# Patient Record
Sex: FEMALE | Race: WHITE | NOT HISPANIC OR LATINO | Employment: FULL TIME | URBAN - METROPOLITAN AREA
[De-identification: names, ages, dates, MRNs, and addresses within clinical notes are randomized per-mention and may not be internally consistent; named-entity substitution may affect disease eponyms.]

---

## 2021-02-21 ENCOUNTER — APPOINTMENT (OUTPATIENT)
Dept: CT IMAGING | Facility: CLINIC | Age: 56
DRG: 273 | End: 2021-02-21
Attending: EMERGENCY MEDICINE
Payer: COMMERCIAL

## 2021-02-21 ENCOUNTER — HOSPITAL ENCOUNTER (INPATIENT)
Facility: CLINIC | Age: 56
LOS: 1 days | Discharge: HOME | DRG: 273 | End: 2021-02-23
Attending: EMERGENCY MEDICINE | Admitting: INTERNAL MEDICINE
Payer: COMMERCIAL

## 2021-02-21 DIAGNOSIS — I48.92 ATRIAL FLUTTER WITH RAPID VENTRICULAR RESPONSE (H): ICD-10-CM

## 2021-02-21 DIAGNOSIS — R79.89 ELEVATED TROPONIN: Primary | ICD-10-CM

## 2021-02-21 DIAGNOSIS — R79.89 ELEVATED TROPONIN: ICD-10-CM

## 2021-02-21 DIAGNOSIS — R07.9 CHEST PAIN, UNSPECIFIED TYPE: ICD-10-CM

## 2021-02-21 LAB
ALBUMIN SERPL-MCNC: 3.9 G/DL (ref 3.4–5)
ALP SERPL-CCNC: 79 U/L (ref 40–150)
ALT SERPL W P-5'-P-CCNC: 28 U/L (ref 0–50)
ANION GAP SERPL CALCULATED.3IONS-SCNC: 6 MMOL/L (ref 3–14)
AST SERPL W P-5'-P-CCNC: 18 U/L (ref 0–45)
BASOPHILS # BLD AUTO: 0.1 10E9/L (ref 0–0.2)
BASOPHILS NFR BLD AUTO: 0.6 %
BILIRUB SERPL-MCNC: 0.3 MG/DL (ref 0.2–1.3)
BUN SERPL-MCNC: 22 MG/DL (ref 7–30)
CALCIUM SERPL-MCNC: 8.6 MG/DL (ref 8.5–10.1)
CHLORIDE SERPL-SCNC: 105 MMOL/L (ref 94–109)
CO2 SERPL-SCNC: 27 MMOL/L (ref 20–32)
CREAT SERPL-MCNC: 0.71 MG/DL (ref 0.52–1.04)
D DIMER PPP FEU-MCNC: 0.6 UG/ML FEU (ref 0–0.5)
DIFFERENTIAL METHOD BLD: NORMAL
EOSINOPHIL # BLD AUTO: 0.3 10E9/L (ref 0–0.7)
EOSINOPHIL NFR BLD AUTO: 3.7 %
ERYTHROCYTE [DISTWIDTH] IN BLOOD BY AUTOMATED COUNT: 12.7 % (ref 10–15)
GFR SERPL CREATININE-BSD FRML MDRD: >90 ML/MIN/{1.73_M2}
GLUCOSE SERPL-MCNC: 106 MG/DL (ref 70–99)
HCT VFR BLD AUTO: 39 % (ref 35–47)
HGB BLD-MCNC: 13.4 G/DL (ref 11.7–15.7)
IMM GRANULOCYTES # BLD: 0 10E9/L (ref 0–0.4)
IMM GRANULOCYTES NFR BLD: 0.2 %
INTERPRETATION ECG - MUSE: NORMAL
LYMPHOCYTES # BLD AUTO: 3.8 10E9/L (ref 0.8–5.3)
LYMPHOCYTES NFR BLD AUTO: 44.1 %
MCH RBC QN AUTO: 30.7 PG (ref 26.5–33)
MCHC RBC AUTO-ENTMCNC: 34.4 G/DL (ref 31.5–36.5)
MCV RBC AUTO: 89 FL (ref 78–100)
MONOCYTES # BLD AUTO: 1.1 10E9/L (ref 0–1.3)
MONOCYTES NFR BLD AUTO: 13.2 %
NEUTROPHILS # BLD AUTO: 3.3 10E9/L (ref 1.6–8.3)
NEUTROPHILS NFR BLD AUTO: 38.2 %
NRBC # BLD AUTO: 0 10*3/UL
NRBC BLD AUTO-RTO: 0 /100
PLATELET # BLD AUTO: 346 10E9/L (ref 150–450)
POTASSIUM SERPL-SCNC: 3.6 MMOL/L (ref 3.4–5.3)
PROT SERPL-MCNC: 7.1 G/DL (ref 6.8–8.8)
RBC # BLD AUTO: 4.36 10E12/L (ref 3.8–5.2)
SODIUM SERPL-SCNC: 138 MMOL/L (ref 133–144)
TROPONIN I SERPL-MCNC: <0.015 UG/L (ref 0–0.04)
WBC # BLD AUTO: 8.6 10E9/L (ref 4–11)

## 2021-02-21 PROCEDURE — 96365 THER/PROPH/DIAG IV INF INIT: CPT

## 2021-02-21 PROCEDURE — 250N000013 HC RX MED GY IP 250 OP 250 PS 637: Performed by: EMERGENCY MEDICINE

## 2021-02-21 PROCEDURE — C9803 HOPD COVID-19 SPEC COLLECT: HCPCS

## 2021-02-21 PROCEDURE — 80053 COMPREHEN METABOLIC PANEL: CPT | Performed by: EMERGENCY MEDICINE

## 2021-02-21 PROCEDURE — 84484 ASSAY OF TROPONIN QUANT: CPT | Performed by: EMERGENCY MEDICINE

## 2021-02-21 PROCEDURE — 93005 ELECTROCARDIOGRAM TRACING: CPT

## 2021-02-21 PROCEDURE — 85025 COMPLETE CBC W/AUTO DIFF WBC: CPT | Performed by: EMERGENCY MEDICINE

## 2021-02-21 PROCEDURE — 71275 CT ANGIOGRAPHY CHEST: CPT

## 2021-02-21 PROCEDURE — 85379 FIBRIN DEGRADATION QUANT: CPT | Performed by: EMERGENCY MEDICINE

## 2021-02-21 PROCEDURE — 99285 EMERGENCY DEPT VISIT HI MDM: CPT | Mod: 25

## 2021-02-21 RX ORDER — ASPIRIN 81 MG/1
324 TABLET, CHEWABLE ORAL ONCE
Status: COMPLETED | OUTPATIENT
Start: 2021-02-21 | End: 2021-02-21

## 2021-02-21 RX ORDER — IOPAMIDOL 755 MG/ML
92 INJECTION, SOLUTION INTRAVASCULAR ONCE
Status: COMPLETED | OUTPATIENT
Start: 2021-02-22 | End: 2021-02-22

## 2021-02-21 RX ORDER — FLUOXETINE 10 MG/1
20 CAPSULE ORAL 2 TIMES DAILY
COMMUNITY
End: 2023-04-05

## 2021-02-21 RX ADMIN — ASPIRIN 81 MG CHEWABLE TABLET 324 MG: 81 TABLET CHEWABLE at 23:12

## 2021-02-21 ASSESSMENT — MIFFLIN-ST. JEOR: SCORE: 1239.53

## 2021-02-21 ASSESSMENT — ENCOUNTER SYMPTOMS
SHORTNESS OF BREATH: 1
PALPITATIONS: 1

## 2021-02-22 ENCOUNTER — APPOINTMENT (OUTPATIENT)
Dept: CARDIOLOGY | Facility: CLINIC | Age: 56
DRG: 273 | End: 2021-02-22
Attending: INTERNAL MEDICINE
Payer: COMMERCIAL

## 2021-02-22 PROBLEM — R79.89 ELEVATED TROPONIN: Status: ACTIVE | Noted: 2021-02-21

## 2021-02-22 PROBLEM — I48.92 ATRIAL FLUTTER WITH RAPID VENTRICULAR RESPONSE (H): Status: ACTIVE | Noted: 2021-02-22

## 2021-02-22 PROBLEM — R07.9 CHEST PAIN, UNSPECIFIED TYPE: Status: ACTIVE | Noted: 2021-02-22

## 2021-02-22 LAB
ANION GAP SERPL CALCULATED.3IONS-SCNC: 3 MMOL/L (ref 3–14)
APTT PPP: >240 SEC (ref 22–37)
BUN SERPL-MCNC: 18 MG/DL (ref 7–30)
CALCIUM SERPL-MCNC: 8.7 MG/DL (ref 8.5–10.1)
CHLORIDE SERPL-SCNC: 109 MMOL/L (ref 94–109)
CO2 SERPL-SCNC: 28 MMOL/L (ref 20–32)
CREAT SERPL-MCNC: 0.74 MG/DL (ref 0.52–1.04)
ERYTHROCYTE [DISTWIDTH] IN BLOOD BY AUTOMATED COUNT: 12.8 % (ref 10–15)
ERYTHROCYTE [DISTWIDTH] IN BLOOD BY AUTOMATED COUNT: 12.9 % (ref 10–15)
GFR SERPL CREATININE-BSD FRML MDRD: 89 ML/MIN/{1.73_M2}
GLUCOSE SERPL-MCNC: 99 MG/DL (ref 70–99)
HCT VFR BLD AUTO: 35.6 % (ref 35–47)
HCT VFR BLD AUTO: 36.7 % (ref 35–47)
HGB BLD-MCNC: 11.6 G/DL (ref 11.7–15.7)
HGB BLD-MCNC: 12.1 G/DL (ref 11.7–15.7)
LABORATORY COMMENT REPORT: NORMAL
MAGNESIUM SERPL-MCNC: 2.2 MG/DL (ref 1.6–2.3)
MCH RBC QN AUTO: 29.7 PG (ref 26.5–33)
MCH RBC QN AUTO: 30 PG (ref 26.5–33)
MCHC RBC AUTO-ENTMCNC: 32.6 G/DL (ref 31.5–36.5)
MCHC RBC AUTO-ENTMCNC: 33 G/DL (ref 31.5–36.5)
MCV RBC AUTO: 91 FL (ref 78–100)
MCV RBC AUTO: 91 FL (ref 78–100)
PLATELET # BLD AUTO: 297 10E9/L (ref 150–450)
PLATELET # BLD AUTO: 312 10E9/L (ref 150–450)
POTASSIUM SERPL-SCNC: 3.8 MMOL/L (ref 3.4–5.3)
POTASSIUM SERPL-SCNC: 4.3 MMOL/L (ref 3.4–5.3)
RBC # BLD AUTO: 3.9 10E12/L (ref 3.8–5.2)
RBC # BLD AUTO: 4.03 10E12/L (ref 3.8–5.2)
SARS-COV-2 RNA RESP QL NAA+PROBE: NEGATIVE
SODIUM SERPL-SCNC: 140 MMOL/L (ref 133–144)
SPECIMEN SOURCE: NORMAL
T4 FREE SERPL-MCNC: 0.69 NG/DL (ref 0.76–1.46)
TROPONIN I SERPL-MCNC: 0.11 UG/L (ref 0–0.04)
TROPONIN I SERPL-MCNC: 0.18 UG/L (ref 0–0.04)
TSH SERPL DL<=0.005 MIU/L-ACNC: 4.01 MU/L (ref 0.4–4)
UFH PPP CHRO-ACNC: 0.51 IU/ML
WBC # BLD AUTO: 7.4 10E9/L (ref 4–11)
WBC # BLD AUTO: 8.1 10E9/L (ref 4–11)

## 2021-02-22 PROCEDURE — 4A023N7 MEASUREMENT OF CARDIAC SAMPLING AND PRESSURE, LEFT HEART, PERCUTANEOUS APPROACH: ICD-10-PCS | Performed by: INTERNAL MEDICINE

## 2021-02-22 PROCEDURE — 80048 BASIC METABOLIC PNL TOTAL CA: CPT | Performed by: INTERNAL MEDICINE

## 2021-02-22 PROCEDURE — 84439 ASSAY OF FREE THYROXINE: CPT | Performed by: INTERNAL MEDICINE

## 2021-02-22 PROCEDURE — 250N000013 HC RX MED GY IP 250 OP 250 PS 637: Performed by: INTERNAL MEDICINE

## 2021-02-22 PROCEDURE — 250N000011 HC RX IP 250 OP 636: Performed by: EMERGENCY MEDICINE

## 2021-02-22 PROCEDURE — 85730 THROMBOPLASTIN TIME PARTIAL: CPT | Performed by: INTERNAL MEDICINE

## 2021-02-22 PROCEDURE — 250N000011 HC RX IP 250 OP 636: Performed by: INTERNAL MEDICINE

## 2021-02-22 PROCEDURE — C1887 CATHETER, GUIDING: HCPCS | Performed by: INTERNAL MEDICINE

## 2021-02-22 PROCEDURE — 87635 SARS-COV-2 COVID-19 AMP PRB: CPT | Performed by: EMERGENCY MEDICINE

## 2021-02-22 PROCEDURE — 99152 MOD SED SAME PHYS/QHP 5/>YRS: CPT | Performed by: INTERNAL MEDICINE

## 2021-02-22 PROCEDURE — 83735 ASSAY OF MAGNESIUM: CPT | Performed by: INTERNAL MEDICINE

## 2021-02-22 PROCEDURE — 99153 MOD SED SAME PHYS/QHP EA: CPT | Performed by: INTERNAL MEDICINE

## 2021-02-22 PROCEDURE — C1769 GUIDE WIRE: HCPCS | Performed by: INTERNAL MEDICINE

## 2021-02-22 PROCEDURE — 99222 1ST HOSP IP/OBS MODERATE 55: CPT | Mod: 25 | Performed by: INTERNAL MEDICINE

## 2021-02-22 PROCEDURE — 93621 COMP EP EVL L PAC&REC C SINS: CPT | Performed by: INTERNAL MEDICINE

## 2021-02-22 PROCEDURE — 250N000009 HC RX 250: Performed by: INTERNAL MEDICINE

## 2021-02-22 PROCEDURE — 93010 ELECTROCARDIOGRAM REPORT: CPT | Performed by: INTERNAL MEDICINE

## 2021-02-22 PROCEDURE — 4A023FZ MEASUREMENT OF CARDIAC RHYTHM, PERCUTANEOUS APPROACH: ICD-10-PCS | Performed by: INTERNAL MEDICINE

## 2021-02-22 PROCEDURE — B2101ZZ FLUOROSCOPY OF SINGLE CORONARY ARTERY USING LOW OSMOLAR CONTRAST: ICD-10-PCS | Performed by: INTERNAL MEDICINE

## 2021-02-22 PROCEDURE — 85347 COAGULATION TIME ACTIVATED: CPT

## 2021-02-22 PROCEDURE — 36415 COLL VENOUS BLD VENIPUNCTURE: CPT | Performed by: INTERNAL MEDICINE

## 2021-02-22 PROCEDURE — 99232 SBSQ HOSP IP/OBS MODERATE 35: CPT | Performed by: INTERNAL MEDICINE

## 2021-02-22 PROCEDURE — 02K83ZZ MAP CONDUCTION MECHANISM, PERCUTANEOUS APPROACH: ICD-10-PCS | Performed by: INTERNAL MEDICINE

## 2021-02-22 PROCEDURE — C1894 INTRO/SHEATH, NON-LASER: HCPCS | Performed by: INTERNAL MEDICINE

## 2021-02-22 PROCEDURE — C1732 CATH, EP, DIAG/ABL, 3D/VECT: HCPCS | Performed by: INTERNAL MEDICINE

## 2021-02-22 PROCEDURE — 85027 COMPLETE CBC AUTOMATED: CPT | Performed by: INTERNAL MEDICINE

## 2021-02-22 PROCEDURE — 93005 ELECTROCARDIOGRAM TRACING: CPT

## 2021-02-22 PROCEDURE — 84132 ASSAY OF SERUM POTASSIUM: CPT | Performed by: INTERNAL MEDICINE

## 2021-02-22 PROCEDURE — 93306 TTE W/DOPPLER COMPLETE: CPT | Mod: 26 | Performed by: INTERNAL MEDICINE

## 2021-02-22 PROCEDURE — 93452 LEFT HRT CATH W/VENTRCLGRPHY: CPT | Performed by: INTERNAL MEDICINE

## 2021-02-22 PROCEDURE — 93613 INTRACARDIAC EPHYS 3D MAPG: CPT | Performed by: INTERNAL MEDICINE

## 2021-02-22 PROCEDURE — 272N000001 HC OR GENERAL SUPPLY STERILE: Performed by: INTERNAL MEDICINE

## 2021-02-22 PROCEDURE — 250N000009 HC RX 250: Performed by: EMERGENCY MEDICINE

## 2021-02-22 PROCEDURE — 120N000004 HC R&B MS OVERFLOW

## 2021-02-22 PROCEDURE — 84484 ASSAY OF TROPONIN QUANT: CPT | Performed by: INTERNAL MEDICINE

## 2021-02-22 PROCEDURE — 99222 1ST HOSP IP/OBS MODERATE 55: CPT | Mod: AI | Performed by: INTERNAL MEDICINE

## 2021-02-22 PROCEDURE — 93458 L HRT ARTERY/VENTRICLE ANGIO: CPT | Performed by: INTERNAL MEDICINE

## 2021-02-22 PROCEDURE — 93306 TTE W/DOPPLER COMPLETE: CPT

## 2021-02-22 PROCEDURE — 02583ZZ DESTRUCTION OF CONDUCTION MECHANISM, PERCUTANEOUS APPROACH: ICD-10-PCS | Performed by: INTERNAL MEDICINE

## 2021-02-22 PROCEDURE — 84443 ASSAY THYROID STIM HORMONE: CPT | Performed by: INTERNAL MEDICINE

## 2021-02-22 PROCEDURE — 250N000011 HC RX IP 250 OP 636

## 2021-02-22 PROCEDURE — C1733 CATH, EP, OTHR THAN COOL-TIP: HCPCS | Performed by: INTERNAL MEDICINE

## 2021-02-22 PROCEDURE — 85520 HEPARIN ASSAY: CPT | Performed by: INTERNAL MEDICINE

## 2021-02-22 PROCEDURE — C1731 CATH, EP, 20 OR MORE ELEC: HCPCS | Performed by: INTERNAL MEDICINE

## 2021-02-22 PROCEDURE — 258N000003 HC RX IP 258 OP 636: Performed by: INTERNAL MEDICINE

## 2021-02-22 PROCEDURE — 93653 COMPRE EP EVAL TX SVT: CPT | Performed by: INTERNAL MEDICINE

## 2021-02-22 RX ORDER — FLUMAZENIL 0.1 MG/ML
0.2 INJECTION, SOLUTION INTRAVENOUS
Status: ACTIVE | OUTPATIENT
Start: 2021-02-22 | End: 2021-02-22

## 2021-02-22 RX ORDER — AMOXICILLIN 250 MG
1 CAPSULE ORAL 2 TIMES DAILY PRN
Status: DISCONTINUED | OUTPATIENT
Start: 2021-02-22 | End: 2021-02-23 | Stop reason: HOSPADM

## 2021-02-22 RX ORDER — HEPARIN SODIUM 10000 [USP'U]/100ML
0-5000 INJECTION, SOLUTION INTRAVENOUS CONTINUOUS
Status: DISCONTINUED | OUTPATIENT
Start: 2021-02-22 | End: 2021-02-22

## 2021-02-22 RX ORDER — ASPIRIN 81 MG/1
81 TABLET ORAL DAILY
Status: DISCONTINUED | OUTPATIENT
Start: 2021-02-22 | End: 2021-02-23

## 2021-02-22 RX ORDER — VERAPAMIL HYDROCHLORIDE 2.5 MG/ML
INJECTION, SOLUTION INTRAVENOUS
Status: DISCONTINUED | OUTPATIENT
Start: 2021-02-22 | End: 2021-02-22 | Stop reason: HOSPADM

## 2021-02-22 RX ORDER — CITALOPRAM HYDROBROMIDE 40 MG/1
40 TABLET ORAL DAILY
Status: DISCONTINUED | OUTPATIENT
Start: 2021-02-22 | End: 2021-02-23 | Stop reason: HOSPADM

## 2021-02-22 RX ORDER — CITALOPRAM HYDROBROMIDE 40 MG/1
40 TABLET ORAL DAILY
COMMUNITY
End: 2023-04-05

## 2021-02-22 RX ORDER — POTASSIUM CHLORIDE 1500 MG/1
20 TABLET, EXTENDED RELEASE ORAL
Status: CANCELLED | OUTPATIENT
Start: 2021-02-22

## 2021-02-22 RX ORDER — CEFAZOLIN SODIUM 1 G/3ML
1 INJECTION, POWDER, FOR SOLUTION INTRAMUSCULAR; INTRAVENOUS
Status: DISCONTINUED | OUTPATIENT
Start: 2021-02-22 | End: 2021-02-22 | Stop reason: HOSPADM

## 2021-02-22 RX ORDER — LIDOCAINE 40 MG/G
CREAM TOPICAL
Status: CANCELLED | OUTPATIENT
Start: 2021-02-22

## 2021-02-22 RX ORDER — SODIUM CHLORIDE 9 MG/ML
INJECTION, SOLUTION INTRAVENOUS CONTINUOUS
Status: CANCELLED | OUTPATIENT
Start: 2021-02-22

## 2021-02-22 RX ORDER — ATROPINE SULFATE 0.1 MG/ML
0.5 INJECTION INTRAVENOUS
Status: ACTIVE | OUTPATIENT
Start: 2021-02-22 | End: 2021-02-22

## 2021-02-22 RX ORDER — MULTIVITAMIN,THERAPEUTIC
1 TABLET ORAL DAILY
COMMUNITY

## 2021-02-22 RX ORDER — ACETAMINOPHEN 325 MG/1
650 TABLET ORAL EVERY 4 HOURS PRN
Status: DISCONTINUED | OUTPATIENT
Start: 2021-02-22 | End: 2021-02-22

## 2021-02-22 RX ORDER — SODIUM CHLORIDE 9 MG/ML
INJECTION, SOLUTION INTRAVENOUS CONTINUOUS
Status: DISCONTINUED | OUTPATIENT
Start: 2021-02-22 | End: 2021-02-23 | Stop reason: HOSPADM

## 2021-02-22 RX ORDER — HEPARIN SODIUM 200 [USP'U]/100ML
100-500 INJECTION, SOLUTION INTRAVENOUS CONTINUOUS PRN
Status: DISCONTINUED | OUTPATIENT
Start: 2021-02-22 | End: 2021-02-22 | Stop reason: HOSPADM

## 2021-02-22 RX ORDER — FENTANYL CITRATE 50 UG/ML
INJECTION, SOLUTION INTRAMUSCULAR; INTRAVENOUS
Status: DISCONTINUED | OUTPATIENT
Start: 2021-02-22 | End: 2021-02-22 | Stop reason: HOSPADM

## 2021-02-22 RX ORDER — LIDOCAINE 40 MG/G
CREAM TOPICAL
Status: DISCONTINUED | OUTPATIENT
Start: 2021-02-22 | End: 2021-02-22

## 2021-02-22 RX ORDER — CHOLECALCIFEROL (VITAMIN D3) 50 MCG
1 TABLET ORAL DAILY
COMMUNITY
End: 2023-04-05

## 2021-02-22 RX ORDER — HEPARIN SODIUM 1000 [USP'U]/ML
INJECTION, SOLUTION INTRAVENOUS; SUBCUTANEOUS
Status: DISCONTINUED | OUTPATIENT
Start: 2021-02-22 | End: 2021-02-22 | Stop reason: HOSPADM

## 2021-02-22 RX ORDER — NITROGLYCERIN 0.4 MG/1
0.4 TABLET SUBLINGUAL EVERY 5 MIN PRN
Status: DISCONTINUED | OUTPATIENT
Start: 2021-02-22 | End: 2021-02-23 | Stop reason: HOSPADM

## 2021-02-22 RX ORDER — ACETAMINOPHEN 325 MG/1
650 TABLET ORAL EVERY 4 HOURS PRN
Status: DISCONTINUED | OUTPATIENT
Start: 2021-02-22 | End: 2021-02-23 | Stop reason: HOSPADM

## 2021-02-22 RX ORDER — NALOXONE HYDROCHLORIDE 0.4 MG/ML
0.4 INJECTION, SOLUTION INTRAMUSCULAR; INTRAVENOUS; SUBCUTANEOUS
Status: DISCONTINUED | OUTPATIENT
Start: 2021-02-22 | End: 2021-02-23 | Stop reason: HOSPADM

## 2021-02-22 RX ORDER — NITROGLYCERIN 5 MG/ML
VIAL (ML) INTRAVENOUS
Status: DISCONTINUED | OUTPATIENT
Start: 2021-02-22 | End: 2021-02-22 | Stop reason: HOSPADM

## 2021-02-22 RX ORDER — HEPARIN SODIUM 200 [USP'U]/100ML
100-600 INJECTION, SOLUTION INTRAVENOUS CONTINUOUS PRN
Status: DISCONTINUED | OUTPATIENT
Start: 2021-02-22 | End: 2021-02-22 | Stop reason: HOSPADM

## 2021-02-22 RX ORDER — DOBUTAMINE HYDROCHLORIDE 200 MG/100ML
5-40 INJECTION INTRAVENOUS CONTINUOUS PRN
Status: DISCONTINUED | OUTPATIENT
Start: 2021-02-22 | End: 2021-02-22 | Stop reason: HOSPADM

## 2021-02-22 RX ORDER — LIDOCAINE 40 MG/G
CREAM TOPICAL
Status: DISCONTINUED | OUTPATIENT
Start: 2021-02-22 | End: 2021-02-23 | Stop reason: HOSPADM

## 2021-02-22 RX ORDER — LORAZEPAM 0.5 MG/1
0.5 TABLET ORAL
Status: CANCELLED | OUTPATIENT
Start: 2021-02-22

## 2021-02-22 RX ORDER — LORAZEPAM 2 MG/ML
0.5 INJECTION INTRAMUSCULAR
Status: CANCELLED | OUTPATIENT
Start: 2021-02-22

## 2021-02-22 RX ORDER — AMOXICILLIN 250 MG
2 CAPSULE ORAL 2 TIMES DAILY PRN
Status: DISCONTINUED | OUTPATIENT
Start: 2021-02-22 | End: 2021-02-23 | Stop reason: HOSPADM

## 2021-02-22 RX ORDER — ACETAMINOPHEN 650 MG/1
650 SUPPOSITORY RECTAL EVERY 4 HOURS PRN
Status: DISCONTINUED | OUTPATIENT
Start: 2021-02-22 | End: 2021-02-23 | Stop reason: HOSPADM

## 2021-02-22 RX ORDER — ONDANSETRON 4 MG/1
4 TABLET, ORALLY DISINTEGRATING ORAL EVERY 6 HOURS PRN
Status: DISCONTINUED | OUTPATIENT
Start: 2021-02-22 | End: 2021-02-23 | Stop reason: HOSPADM

## 2021-02-22 RX ORDER — OXYCODONE AND ACETAMINOPHEN 5; 325 MG/1; MG/1
1 TABLET ORAL EVERY 4 HOURS PRN
Status: DISCONTINUED | OUTPATIENT
Start: 2021-02-22 | End: 2021-02-23 | Stop reason: HOSPADM

## 2021-02-22 RX ORDER — MULTIVITAMIN WITH IRON
1 TABLET ORAL DAILY
COMMUNITY
End: 2023-04-05

## 2021-02-22 RX ORDER — HEPARIN SODIUM 10000 [USP'U]/100ML
0-5000 INJECTION, SOLUTION INTRAVENOUS CONTINUOUS
Status: DISCONTINUED | OUTPATIENT
Start: 2021-02-22 | End: 2021-02-22 | Stop reason: CLARIF

## 2021-02-22 RX ORDER — SODIUM PHOSPHATE,MONO-DIBASIC 19G-7G/118
1 ENEMA (ML) RECTAL DAILY
COMMUNITY
End: 2023-04-05

## 2021-02-22 RX ORDER — IOPAMIDOL 755 MG/ML
INJECTION, SOLUTION INTRAVASCULAR
Status: DISCONTINUED | OUTPATIENT
Start: 2021-02-22 | End: 2021-02-22 | Stop reason: HOSPADM

## 2021-02-22 RX ORDER — ONDANSETRON 2 MG/ML
4 INJECTION INTRAMUSCULAR; INTRAVENOUS EVERY 6 HOURS PRN
Status: DISCONTINUED | OUTPATIENT
Start: 2021-02-22 | End: 2021-02-23 | Stop reason: HOSPADM

## 2021-02-22 RX ORDER — NALOXONE HYDROCHLORIDE 0.4 MG/ML
0.2 INJECTION, SOLUTION INTRAMUSCULAR; INTRAVENOUS; SUBCUTANEOUS
Status: DISCONTINUED | OUTPATIENT
Start: 2021-02-22 | End: 2021-02-23 | Stop reason: HOSPADM

## 2021-02-22 RX ORDER — FENTANYL CITRATE 50 UG/ML
25-50 INJECTION, SOLUTION INTRAMUSCULAR; INTRAVENOUS
Status: ACTIVE | OUTPATIENT
Start: 2021-02-22 | End: 2021-02-22

## 2021-02-22 RX ADMIN — METOPROLOL TARTRATE 12.5 MG: 25 TABLET, FILM COATED ORAL at 20:45

## 2021-02-22 RX ADMIN — ASPIRIN 81 MG: 81 TABLET, DELAYED RELEASE ORAL at 09:46

## 2021-02-22 RX ADMIN — SODIUM CHLORIDE: 9 INJECTION, SOLUTION INTRAVENOUS at 02:45

## 2021-02-22 RX ADMIN — FLUOXETINE 20 MG: 20 CAPSULE ORAL at 20:46

## 2021-02-22 RX ADMIN — METOPROLOL TARTRATE 12.5 MG: 25 TABLET, FILM COATED ORAL at 09:46

## 2021-02-22 RX ADMIN — IOPAMIDOL 92 ML: 755 INJECTION, SOLUTION INTRAVENOUS at 00:02

## 2021-02-22 RX ADMIN — SODIUM CHLORIDE 60 ML: 9 INJECTION, SOLUTION INTRAVENOUS at 00:02

## 2021-02-22 RX ADMIN — SODIUM CHLORIDE: 9 INJECTION, SOLUTION INTRAVENOUS at 12:28

## 2021-02-22 RX ADMIN — APIXABAN 5 MG: 5 TABLET, FILM COATED ORAL at 20:46

## 2021-02-22 RX ADMIN — FLUOXETINE 20 MG: 20 CAPSULE ORAL at 12:28

## 2021-02-22 RX ADMIN — CITALOPRAM HYDROBROMIDE 40 MG: 40 TABLET ORAL at 12:28

## 2021-02-22 RX ADMIN — HEPARIN SODIUM 800 UNITS/HR: 10000 INJECTION, SOLUTION INTRAVENOUS at 01:02

## 2021-02-22 ASSESSMENT — MIFFLIN-ST. JEOR: SCORE: 1290.33

## 2021-02-22 ASSESSMENT — ACTIVITIES OF DAILY LIVING (ADL)
ADLS_ACUITY_SCORE: 15

## 2021-02-22 NOTE — PLAN OF CARE
Arrived to unit at 0200. A&O x4. VSS. Tele NSR. Up independently in room. Heparin gtt at 800 units/hr. Lung sounds clear. Bowel sounds active. NPO. Voiding adequately. Denies pain.

## 2021-02-22 NOTE — H&P
Admitted:     02/21/2021     DATE Of SERVICE: 02/22/2021.     PRIMARY CARE PHYSICIAN:  [facility]      CHIEF COMPLAINT:  Palpitations and chest pain.      HISTORY OF PRESENT ILLNESS:  Had been obtained from the patient who is a good historian.  I discussed with ER attending, Dr. Bishop and I reviewed her chart as well.      Mrs. Shabnam Shukla is an extremely pleasant 55-year-old female with past medical history of depression/anxiety, who presented for evaluation of palpitations and chest pain.      The patient states that for the last year and a half, she was having intermittent episodes of chest discomfort associated with palpitations, dizziness, shortness of breath and feeling that she can pass out that usually lasting 30-45 seconds and would resolve by itself.  She did not seek medical attention for these issues.  She reports that these episodes of palpitations/chest pain/shortness of breath would occur, usually with exertion.      She states that last night around 9:00 p.m. while she was lying down, she started having again palpitations and chest pain.  She describes the pain as located midsternal, nonradiating, described as dull, burning feeling.  She reports the pain as a 6 to 7/10 in intensity, associated with some dizziness and shortness of breath.  Her symptoms lasted longer than her usual episodes, so she called 911.  As per report, she was in atrial flutter with rapid ventricular rate by EMS arrival.  She was brought into the ER.  Apparently, she converted spontaneously to normal sinus rhythm.  She was in normal sinus rhythm at the time of her arrival in ER.  Her vitals in ER showed a blood pressure of 127/81, heart rate was 102 initially, later on improved to 87, respiratory rate 16, oxygen saturation 96% on room air and temperature 97.9.      In the ER, she was seen by Dr. Bishop.  She had basic blood work which showed unremarkable BMP.  Her sodium was 138, potassium 3.6, chloride 105,  bicarbonate 27, BUN 22, creatinine was 0.71.  Her calcium was 8.1, anion gap of 6, albumin 3.9, total protein 7.1, total bilirubin 0.3, alkaline phosphatase 79, ALT 28, AST 18.  Her troponin was negative, less than 0.015.  Glucose 106.  CBC with white blood cells 8.6, hemoglobin 13.4, hematocrit 39 and platelet count 346.  Her D-dimer was mildly elevated at 0.6.  She had a CT of the chest with IV contrast, which was negative for any pulmonary embolism or any acute cardiopulmonary disease.  She was tested negative for COVID-19 infection.  Her EKG in ER showed normal sinus rhythm at the rate of 96-95 beats per minute, no ischemic changes.      Upon further questioning, the patient denies any recent fevers.  She denies any headache, no nausea, no vomiting, no coughing.  She denies any abdominal pain, no diarrhea, no constipation, no dysuria, no leg swelling.      In ER, she was given aspirin 325 mg p.o. x 1.  Given a concern for unstable angina, she was started on heparin drip and Hospitalist Service was called regarding the admission.      PAST MEDICAL HISTORY:  Depression/anxiety.      PAST SURGICAL HISTORY:   Past Surgical History:   Procedure Laterality Date     CV CORONARY ANGIOGRAM N/A 2/22/2021    Procedure: Coronary Angiogram;  Surgeon: Lucille Francis MD;  Location: [facility] CARDIAC CATH LAB     CV LEFT HEART CATH N/A 2/22/2021    Procedure: Left Heart Cath;  Surgeon: Lucille Francis MD;  Location: [facility] CARDIAC CATH LAB     EP ABLATION ATRIAL FLUTTER N/A 2/22/2021    Procedure: EP Ablation Atrial Flutter;  Surgeon: Juan R Lloyd MD;  Location: [facility] CARDIAC CATH LAB        FAMILY HISTORY:  She reports that her mother had 3 myocardial infarctions and stents placed.  Her father seems to be in good state of health.  She has a daughter also no major medical problems.      SOCIAL HISTORY:  She used to smoke.  She quit smoking 2 months ago.  She drinks alcohol occasionally.  She denies illicit  drug abuse.      PRIOR TO ADMISSION MEDICATIONS:   citalopram (CELEXA) 40 MG tablet Take 40 mg by mouth daily 2/21/2021 at am Yes Unknown, Entered By History   FLUoxetine (PROZAC) 10 MG capsule Take 20 mg by mouth 2 times daily 2/21/2021 at am Yes Reported, Patient   glucosamine-chondroitin 500-400 MG CAPS per capsule Take 1 capsule by mouth daily 2/21/2021 at am Yes Unknown, Entered By History   multivitamin, therapeutic (THERA-VIT) TABS tablet Take 1 tablet by mouth daily 2/21/2021 at am Yes Unknown, Entered By History   vitamin (B COMPLEX-C) tablet Take 1 tablet by mouth daily 2/21/2021 at am Yes Unknown, Entered By History   vitamin D3 (CHOLECALCIFEROL) 50 mcg (2000 units) tablet Take 1 tablet by mouth daily 2/21/2021 at am Yes Unknown, Entered By History          ALLERGIES:  NO KNOWN DRUG ALLERGIES.      REVIEW OF SYSTEMS:  The 10-point review of systems was conducted and it was negative except for pertinent positives mentioned in history of present illness.      PHYSICAL EXAMINATION:   VITAL SIGNS:  Blood pressure is 111/74, heart rate 88, respiratory rate 18, oxygen saturation 97% on room air, temperature 97.9.   GENERAL:  The patient is awake, alert, no acute distress at the time of my examination.   HEENT:  Normocephalic, atraumatic.  Pupils are equally round and reactive to light.  Oral mucosa is moist.   NECK:  Supple.  No cervical lymphadenopathy, no thyromegaly.   CHEST:  There is bilateral air entry, no wheezing, no rales, no crackles.   CARDIOVASCULAR:  There is normal S1, S2, regular rate and rhythm, no murmurs, no rubs.   ABDOMEN:  Soft, nontender, nondistended.  Bowel sounds are present.   EXTREMITIES:  There is no leg swelling, no calf tenderness, 2+ peripheral pulses are palpable.   SKIN:  Intact, no rashes, no cyanosis.   NEUROLOGIC:  The patient is awake, alert, oriented to self, place and time.  There are no focal neurological deficits.   PSYCHIATRIC:  Normal mood, normal affect.    MUSCULOSKELETAL:  She moves all extremities freely.  There are no obvious joint deformities.      LABORATORY DATA:  Reviewed and dictated above.      ASSESSMENT AND PLAN:  Mrs. Shabnam Shukla is a very pleasant 55-year-old female with a past medical history of depression/anxiety, who presented for evaluation of an episode of palpitations associated with chest pain or shortness of breath.  She was found to be in atrial flutter with rapid ventricular rate by EMS, which eventually converted back to sinus rhythm.   1.  Paroxysmal atrial flutter with rapid ventricular response  2.  Concern for unstable angina.    She reports that for the last year and a half she was having intermittent episodes of palpitations associated with chest discomfort, shortness of breath, dizziness and feeling that she was going to pass out.  She does have symptoms occurs with exertion and last 30-45 seconds and resolved by itself, but last night she had an episode that occurred at rest and lasted more than 15 minutes.  EMS was called and apparently she was in atrial flutter with rapid ventricular rate, which eventually converted back to normal sinus rhythm spontaneously.  She was in normal sinus rhythm in ER.  She was hemodynamically stable and she did not have any chest pain.  In ER, she was given aspirin 324 mg p.o. and started on heparin drip given concern for unstable angina.  Her troponin is negative and EKG does not show any ischemic changes.  She is admitted to [facility].  She will be monitored on telemetry.  We will check her TSH.  We will continue to monitor serial troponins.  We will continue with a baby aspirin and heparin drip at this time.  Echocardiogram had been ordered for the morning.  Cardiology consult requested.  We will keep her n.p.o. for now.  We will defer to Cardiology if they want to proceed with a cardiac stress test versus angiogram.  We will start her on low-dose metoprolol 12.5 mg p.o. twice daily.  We will  check magnesium level and replace electrolytes as per protocol.   3.  History of depression/anxiety.  We will resume her prior to admission Prozac and Celexa once medications will be verified by the pharmacy.   4.  Deep venous thrombosis prophylaxis.  The patient is fully anticoagulated with heparin drip at this time.   5.  CODE STATUS:  Discussed with the patient.  The patient is full code.   6.  DISPOSITION:  Admit inpatient.  I anticipate couple of days of hospitalization.         RAYNE YOUNG MD             D: 2021   T: 2021   MT: [facility]      Name:     CHUCK MARIN   MRN:      [id number]        Account:      [id number]   :      1965        Admitted:     2021                   Document: [id number]       cc: [facility]

## 2021-02-22 NOTE — PRE-PROCEDURE
GENERAL PRE-PROCEDURE:   Procedure:  Aflutter ablation  Date/Time:  2/22/2021 2:07 PM    Written consent obtained?: Yes    Risks and benefits: Risks, benefits and alternatives were discussed    Consent given by:  Patient  Patient states understanding of procedure being performed: Yes    Patient's understanding of procedure matches consent: Yes    Procedure consent matches procedure scheduled: Yes    Expected level of sedation:  Moderate  Appropriately NPO:  Yes  ASA Class:  Class 2- mild systemic disease, no acute problems, no functional limitations  Mallampati  :  Grade 2- soft palate, base of uvula, tonsillar pillars, and portion of posterior pharyngeal wall visible  Lungs:  Lungs clear with good breath sounds bilaterally  Heart:  Normal heart sounds and rate  History & Physical reviewed:  History and physical reviewed and no updates needed  Statement of review:  I have reviewed the lab findings, diagnostic data, medications, and the plan for sedation

## 2021-02-22 NOTE — ED NOTES
"[facility]  ED Nurse Handoff Report    ED Chief complaint: Palpitations      ED Diagnosis:   Final diagnoses:   Atrial flutter with rapid ventricular response (H)   Chest pain, unspecified type       Code Status: Full Code    Allergies: No Known Allergies    Patient Story: Pt presents via EMS for palpitations for 15 minutes. Pt has had them for the last 1.5 years but they usually resolve after a short bit. Pt also reports hx of chest pain and SOB with exertion.  Focused Assessment:  Pt is currently in  NSR. Elevated d dimer of 0.6. CT scan chest shows no PE. Heparin bolus and gtt started.    Treatments and/or interventions provided: see above  Patient's response to treatments and/or interventions: see above    To be done/followed up on inpatient unit:  none pending    Does this patient have any cognitive concerns?: none    Activity level - Baseline/Home:  Independent  Activity Level - Current:   Independent    Patient's Preferred language: English   Needed?: No    Isolation: None  Infection: Not Applicable  Patient tested for COVID 19 prior to admission: YES  Bariatric?: No    Vital Signs:   Vitals:    02/21/21 2234   BP: 127/81   Pulse: 102   Resp: 16   Weight: 68 kg (150 lb)   Height: 1.6 m (5' 3\")       Cardiac Rhythm:     Was the PSS-3 completed:   Yes  What interventions are required if any?               Family Comments:  Pat present  OBS brochure/video discussed/provided to patient/family: N/A              Name of person given brochure if not patient: N/A              Relationship to patient: N/A    For the majority of the shift this patient's behavior was Green.   Behavioral interventions performed were information.    ED NURSE PHONE NUMBER: [phone]       "

## 2021-02-22 NOTE — PLAN OF CARE
For AM while pt on floor, pt A&Ox4, independent. VSS on RA. Tele NSR. Denied chest pain. Denied SOB. Heparin drip restarted at 0810 at 600 units/hr.    Pt taken for angiogram in the afternoon, writer informed pt will not come back to unit and will go to obs and then get discharged from there.    Daily Assessment

## 2021-02-22 NOTE — PROVIDER NOTIFICATION
MD Notification    Notified Person: MD    Notified Person Name: Dr. Russo    Notification Date/Time: 2/22/21 at 7:05am    Notification Interaction: Text paged    Purpose of Notification: RHYS guillen critical at 0.179.  Any new orders?    Orders Received: No new orders.

## 2021-02-22 NOTE — CONSULTS
[facility]    Cardiology Consultation     Date of Admission:  2/21/2021  Date of Consult (When I saw the patient): 02/22/21    Assessment & Plan   Shabnam Shukla is a 55 year old female who was admitted on 2/21/2021 with palpitations and chest pain. She carries a PMH significant for depression, anxiety, and family history of premature CAD.     1.  : Atrial Flutter   - currently in NSR, managed on metoprolol  -CHADsVASc - 1 (possible 2-3 dependent on echo and cath results). Anticoagulation dependent on results of diagnostic testing.   - Echocardiogram today    2. Elevated Troponin  - Troponin 0.179  - c/o mild chest burning at present  - Angiogram today. Risks and benefits of coronary angiogram discussed today including, bleeding, bruising, infection, allergic reaction, kidney damage (including need for dialysis), stroke, heart attack, vascular damage, emergency open heart surgery, up to and including death.  Patient indicates understanding and is agreeable to proceed.  - Low dose Aspirin     - Heparin drip  - NPO      Code Status    Full Code    Reason for Consult   Reason for consult: Atrial Flutter/ Chest pain    Primary Care Physician   [facility]    Chief Complaint   Chest pain  History is obtained from the patient    History of Present Illness   Shabnam Shukla is a 55 year old female who presents with chest pain and palpitations. She carries a PMH significant for anxiety/depression and family history of premature coronary artery disease. On 2/21/2021, she was lying, attempted to adjust her pillow and noticed a sudden onset of chest pain and palpitaitons. She admits to similar symptoms of palpitations, lightheadedness, and presyncope over the last 1 1/2 years but episodes would typically resolved in < 1 min. She became concerned when symptoms didn't resolve and called 911. Per ER/ EMS notes, she was noted to be in atrial flutter. Upon arrival to the ER she converted to  NSR. ER workup consisted of normal vitals, negative troponin, EKG demonstrating NSR, CT was negative for PE, and COVID 19 - negative.     Today she feels well on a cardiac standpoint with the exception of mild chest burning. Denies chest pain, shortness of breath, palpitation, PND, orthopnea, presyncope, syncope or LE edema. Telemetry demonstrates NSR with rate of 63 and blood pressures stable at 113/74. Labs today show a normal potassium and magnesium, TSH mildly elevated at 4.01, and elevated troponin 0.179. CBC was unremarkable. She admits to no alcohol x 3 years and quit smoking 2 months ago. She denies any excessive caffeine, sleep apnea, or any unusual stress.       Past Medical History   I have reviewed this patient's medical history and updated it with pertinent information if needed.   History reviewed. No pertinent past medical history.    Past Surgical History   I have reviewed this patient's surgical history and updated it with pertinent information if needed.  History reviewed. No pertinent surgical history.    Prior to Admission Medications   Prior to Admission Medications   Prescriptions Last Dose Informant Patient Reported? Taking?   FLUoxetine (PROZAC) 10 MG capsule 2/21/2021 at am Self Yes Yes   Sig: Take 20 mg by mouth 2 times daily   citalopram (CELEXA) 40 MG tablet 2/21/2021 at am Self Yes Yes   Sig: Take 40 mg by mouth daily   glucosamine-chondroitin 500-400 MG CAPS per capsule 2/21/2021 at am Self Yes Yes   Sig: Take 1 capsule by mouth daily   multivitamin, therapeutic (THERA-VIT) TABS tablet 2/21/2021 at am Self Yes Yes   Sig: Take 1 tablet by mouth daily   vitamin (B COMPLEX-C) tablet 2/21/2021 at am Self Yes Yes   Sig: Take 1 tablet by mouth daily   vitamin D3 (CHOLECALCIFEROL) 50 mcg (2000 units) tablet 2/21/2021 at am Self Yes Yes   Sig: Take 1 tablet by mouth daily      Facility-Administered Medications: None     Allergies   No Known Allergies    Social History   I have reviewed this  patient's social history and updated it with pertinent information if needed. Shabnam Shukla  reports that she has been smoking. She has never used smokeless tobacco.    Family History   I have reviewed this patient's family history and updated it with pertinent information if needed.   No family history on file.    Review of Systems   The 10 point Review of Systems is negative other than noted in the HPI or here.     Physical Exam   Temp: 97.9  F (36.6  C) Temp src: Oral BP: 113/71 Pulse: 76   Resp: 16 SpO2: 97 % O2 Device: None (Room air)    Vital Signs with Ranges  Temp:  [97.9  F (36.6  C)] 97.9  F (36.6  C)  Pulse:  [] 76  Resp:  [16-19] 16  BP: ()/(62-81) 113/71  SpO2:  [96 %-98 %] 97 %  161 lbs 3.2 oz    Constitutional: awake, alert, cooperative, no apparent distress, and appears stated age  ENT: normocepalic, without obvious abnormality  Hematologic / Lymphatic: no cervical lymphadenopathy  Respiratory: No increased work of breathing, good air exchange, clear to auscultation bilaterally, no crackles or wheezing  Cardiovascular: normal S1 and S2  GI: No scars, normal bowel sounds, soft, non-distended, non-tender, no masses palpated, no hepatosplenomegally  Skin: no bruising or bleeding  Musculoskeletal: no lower extremity pitting edema present  Neurologic: Awake, alert, oriented to name, place and time.  Neuropsychiatric: General: normal, calm and normal eye contact    Data   Reviewed     Jen Mike, MATTHEW CNP 2/22/2021

## 2021-02-22 NOTE — CONSULTS
[facility]    Cardiac Electrophysiology Consultation     Date of Admission:  2/21/2021  Date of Consult (When I saw the patient): 02/22/21    Assessment & Plan   Shabnam Shukla is a 55 year old female who was admitted on 2/21/2021. I was asked to see the patient for atrial flutter. Known palpitations a/w chest burning past several months, usually would resolve spontaneously until now. No cp when without palpitations. Noted to be in atrial flutter with 2:1 AV conduction at ventricular rate of 143 bpm with ECG morphology highly suggestive of typical right sided aflutter. Converted spontaneously with resolution of both palpitations and chest burn. Peaked trop 0.179    Symptomatic pAFL with likely demand ischemia. Pt is scheduled for cath in lieu of her fhx of premature CAD. Discussed etiol of AFL and potential complications including CVA. Given typical atrial flutter recommending ablation as it has a very high curative rate and can be done this pm after angiogram provided no intervention. Start Eliquis 5 mg bid for 1 month after ablation and no need for ASA. pts with afl are a higher risk of having in AFib in the future but timing is unpredictable. Ok to be discharged to home today after ablation. Discussed risks of the procedure including but not limited to vascular injury and cva.    Yandel Nice    Code Status    Full Code    Primary Care Physician   [facility]    History is obtained from the patient    Past Medical History   I have reviewed this patient's medical history and updated it with pertinent information if needed.   History reviewed. No pertinent past medical history.    Past Surgical History   I have reviewed this patient's surgical history and updated it with pertinent information if needed.  History reviewed. No pertinent surgical history.    Prior to Admission Medications   Prior to Admission Medications   Prescriptions Last Dose Informant Patient Reported?  Taking?   FLUoxetine (PROZAC) 10 MG capsule 2/21/2021 at am Self Yes Yes   Sig: Take 20 mg by mouth 2 times daily   citalopram (CELEXA) 40 MG tablet 2/21/2021 at am Self Yes Yes   Sig: Take 40 mg by mouth daily   glucosamine-chondroitin 500-400 MG CAPS per capsule 2/21/2021 at am Self Yes Yes   Sig: Take 1 capsule by mouth daily   multivitamin, therapeutic (THERA-VIT) TABS tablet 2/21/2021 at am Self Yes Yes   Sig: Take 1 tablet by mouth daily   vitamin (B COMPLEX-C) tablet 2/21/2021 at am Self Yes Yes   Sig: Take 1 tablet by mouth daily   vitamin D3 (CHOLECALCIFEROL) 50 mcg (2000 units) tablet 2/21/2021 at am Self Yes Yes   Sig: Take 1 tablet by mouth daily      Facility-Administered Medications: None     Allergies   No Known Allergies    Social History   I have reviewed this patient's social history and updated it with pertinent information if needed. Shabnam LEVY Gayla  reports that she has been smoking. She has never used smokeless tobacco.    Family History   I have reviewed this patient's family history and updated it with pertinent information if needed.   No family history on file.    Review of Systems   Comprehensive review of systems was performed with pertinent positives and negatives listed in assessment and plan section.    Physical Exam   Temp: 98  F (36.7  C) Temp src: Oral BP: 111/76 Pulse: 73   Resp: 16 SpO2: 97 % O2 Device: None (Room air)    Vital Signs with Ranges  Temp:  [97.9  F (36.6  C)-98  F (36.7  C)] 98  F (36.7  C)  Pulse:  [] 73  Resp:  [16-19] 16  BP: ()/(62-81) 111/76  SpO2:  [96 %-98 %] 97 %  161 lbs 3.2 oz    Constitutional: awake, alert, cooperative, no apparent distress, and appears stated age  Eyes: Lids and lashes normal, pupils equal, round and, extra ocular muscles intact, sclera clear, conjunctiva normal  ENT: Normocephalic, without obvious abnormality, atraumatic, sinuses nontender on palpation, external ears without lesions, oral pharynx with moist mucous membranes,  tonsils without erythema or exudates, gums normal and good dentition.  Hematologic / Lymphatic: no cervical lymphadenopathy  Respiratory: No increased work of breathing, good air exchange, clear to auscultation bilaterally, no crackles or wheezing  Cardiovascular: Normal apical impulse, regular rate and rhythm, normal S1 and S2, no S3 or S4, and no murmur noted  GI: No scars, normal bowel sounds, soft, non-distended, non-tender, no masses palpated, no hepatosplenomegally  Skin: no bruising or bleeding  Musculoskeletal: There is no redness, warmth, or swelling of the joints.  Full range of motion noted.    Neurologic: Awake, alert,   Neuropsychiatric: General: normal, calm and normal eye contact    Data   I personally reviewed all recent ECGs and images.  Results for orders placed or performed during the hospital encounter of 02/21/21 (from the past 24 hour(s))   EKG 12-lead, tracing only   Result Value Ref Range    Interpretation ECG Click View Image link to view waveform and result    CBC with platelets differential   Result Value Ref Range    WBC 8.6 4.0 - 11.0 10e9/L    RBC Count 4.36 3.8 - 5.2 10e12/L    Hemoglobin 13.4 11.7 - 15.7 g/dL    Hematocrit 39.0 35.0 - 47.0 %    MCV 89 78 - 100 fl    MCH 30.7 26.5 - 33.0 pg    MCHC 34.4 31.5 - 36.5 g/dL    RDW 12.7 10.0 - 15.0 %    Platelet Count 346 150 - 450 10e9/L    Diff Method Automated Method     % Neutrophils 38.2 %    % Lymphocytes 44.1 %    % Monocytes 13.2 %    % Eosinophils 3.7 %    % Basophils 0.6 %    % Immature Granulocytes 0.2 %    Nucleated RBCs 0 0 /100    Absolute Neutrophil 3.3 1.6 - 8.3 10e9/L    Absolute Lymphocytes 3.8 0.8 - 5.3 10e9/L    Absolute Monocytes 1.1 0.0 - 1.3 10e9/L    Absolute Eosinophils 0.3 0.0 - 0.7 10e9/L    Absolute Basophils 0.1 0.0 - 0.2 10e9/L    Abs Immature Granulocytes 0.0 0 - 0.4 10e9/L    Absolute Nucleated RBC 0.0    Troponin I   Result Value Ref Range    Troponin I ES <0.015 0.000 - 0.045 ug/L   Comprehensive metabolic  panel   Result Value Ref Range    Sodium 138 133 - 144 mmol/L    Potassium 3.6 3.4 - 5.3 mmol/L    Chloride 105 94 - 109 mmol/L    Carbon Dioxide 27 20 - 32 mmol/L    Anion Gap 6 3 - 14 mmol/L    Glucose 106 (H) 70 - 99 mg/dL    Urea Nitrogen 22 7 - 30 mg/dL    Creatinine 0.71 0.52 - 1.04 mg/dL    GFR Estimate >90 >60 mL/min/[1.73_m2]    GFR Estimate If Black >90 >60 mL/min/[1.73_m2]    Calcium 8.6 8.5 - 10.1 mg/dL    Bilirubin Total 0.3 0.2 - 1.3 mg/dL    Albumin 3.9 3.4 - 5.0 g/dL    Protein Total 7.1 6.8 - 8.8 g/dL    Alkaline Phosphatase 79 40 - 150 U/L    ALT 28 0 - 50 U/L    AST 18 0 - 45 U/L   D dimer quantitative   Result Value Ref Range    D Dimer 0.6 (H) 0.0 - 0.50 ug/ml FEU   CT Chest Pulmonary Embolism w Contrast    Narrative    EXAM: CT CHEST PULMONARY EMBOLISM W CONTRAST  LOCATION: [facility]  DATE/TIME: 2/21/2021 11:56 PM    INDICATION: PE suspected, low/intermediate prob, positive D-dimer  COMPARISON: None.  TECHNIQUE: CT chest pulmonary angiogram during arterial phase injection of IV contrast. Multiplanar reformats and MIP reconstructions were performed. Dose reduction techniques were used.   CONTRAST: 92mL [id number]-370    FINDINGS:  ANGIOGRAM CHEST: Pulmonary arteries are normal caliber and negative for pulmonary emboli. Thoracic aorta is negative for dissection. No CT evidence of right heart strain.    LUNGS AND PLEURA: Subsegmental atelectasis right lower lobe.    MEDIASTINUM/AXILLAE: Normal.    CORONARY ARTERY CALCIFICATION: None.    UPPER ABDOMEN: Normal.    MUSCULOSKELETAL: Thoracic scoliosis.      Impression    IMPRESSION:  1.  No evidence for pulmonary emboli.  2.  No evidence for acute pulmonary disease.   Asymptomatic SARS-CoV-2 COVID-19 Virus (Coronavirus) by PCR    Specimen: Nasopharyngeal   Result Value Ref Range    SARS-CoV-2 Virus Specimen Source Nasopharyngeal     SARS-CoV-2 PCR Result NEGATIVE     SARS-CoV-2 PCR Comment (Note)    Cardiology IP Consult: Patient to be  seen: Routine - within 24 hours; episodes of chest pain/palpitations, dizziness over the last year, Aflutter with RVR; Consultant may enter orders: Yes; Requesting provider? Attending physician    Jen Sainz APRN CNP     2/22/2021  9:21 AM  [facility]    Cardiology Consultation     Date of Admission:  2/21/2021  Date of Consult (When I saw the patient): 02/22/21    Assessment & Plan   Shabnam Shukla is a 55 year old female who was admitted on   2/21/2021 with palpitations and chest pain. She carries a PMH   significant for depression, anxiety, and family history of   premature CAD.     1.  : Atrial Flutter   - currently in NSR, managed on metoprolol  -CHADsVASc - 1 (possible 2-3 dependent on echo and cath results).   Anticoagulation dependent on results of diagnostic testing.   - Echocardiogram today    2. Elevated Troponin  - Troponin 0.179  - c/o mild chest burning at present  - Angiogram today. Risks and benefits of coronary angiogram   discussed today including, bleeding, bruising, infection,   allergic reaction, kidney damage (including need for dialysis),   stroke, heart attack, vascular damage, emergency open heart   surgery, up to and including death.  Patient indicates   understanding and is agreeable to proceed.  - Low dose Aspirin     - Heparin drip  - NPO      Code Status    Full Code    Reason for Consult   Reason for consult: Atrial Flutter/ Chest pain    Primary Care Physician   [facility]    Chief Complaint   Chest pain  History is obtained from the patient    History of Present Illness   Shabnam Shukla is a 55 year old female who presents with chest   pain and palpitations. She carries a PMH significant for   anxiety/depression and family history of premature coronary   artery disease. On 2/21/2021, she was lying, attempted to adjust   her pillow and noticed a sudden onset of chest pain and   palpitaitons. She admits to similar symptoms of  palpitations,   lightheadedness, and presyncope over the last 1 1/2 years but   episodes would typically resolved in < 1 min. She became   concerned when symptoms didn't resolve and called 911. Per ER/   EMS notes, she was noted to be in atrial flutter. Upon arrival to   the ER she converted to NSR. ER workup consisted of normal   vitals, negative troponin, EKG demonstrating NSR, CT was negative   for PE, and COVID 19 - negative.     Today she feels well on a cardiac standpoint with the exception   of mild chest burning. Denies chest pain, shortness of breath,   palpitation, PND, orthopnea, presyncope, syncope or LE edema.   Telemetry demonstrates NSR with rate of 63 and blood pressures   stable at 113/74. Labs today show a normal potassium and   magnesium, TSH mildly elevated at 4.01, and elevated troponin   0.179. CBC was unremarkable. She admits to no alcohol x 3 years   and quit smoking 2 months ago. She denies any excessive caffeine,   sleep apnea, or any unusual stress.       Past Medical History   I have reviewed this patient's medical history and updated it   with pertinent information if needed.   History reviewed. No pertinent past medical history.    Past Surgical History   I have reviewed this patient's surgical history and updated it   with pertinent information if needed.  History reviewed. No pertinent surgical history.    Prior to Admission Medications   Prior to Admission Medications   Prescriptions Last Dose Informant Patient Reported? Taking?   FLUoxetine (PROZAC) 10 MG capsule 2/21/2021 at am Self Yes Yes   Sig: Take 20 mg by mouth 2 times daily   citalopram (CELEXA) 40 MG tablet 2/21/2021 at am Self Yes Yes   Sig: Take 40 mg by mouth daily   glucosamine-chondroitin 500-400 MG CAPS per capsule 2/21/2021 at   am Self Yes Yes   Sig: Take 1 capsule by mouth daily   multivitamin, therapeutic (THERA-VIT) TABS tablet 2/21/2021 at am   Self Yes Yes   Sig: Take 1 tablet by mouth daily   vitamin (B  COMPLEX-C) tablet 2/21/2021 at am Self Yes Yes   Sig: Take 1 tablet by mouth daily   vitamin D3 (CHOLECALCIFEROL) 50 mcg (2000 units) tablet 2/21/2021   at am Self Yes Yes   Sig: Take 1 tablet by mouth daily      Facility-Administered Medications: None     Allergies   No Known Allergies    Social History   I have reviewed this patient's social history and updated it with   pertinent information if needed. Shabnam Shukla  reports that   she has been smoking. She has never used smokeless tobacco.    Family History   I have reviewed this patient's family history and updated it with   pertinent information if needed.   No family history on file.    Review of Systems   The 10 point Review of Systems is negative other than noted in   the HPI or here.     Physical Exam   Temp: 97.9  F (36.6  C) Temp src: Oral BP: 113/71 Pulse: 76     Resp: 16 SpO2: 97 % O2 Device: None (Room air)    Vital Signs with Ranges  Temp:  [97.9  F (36.6  C)] 97.9  F (36.6  C)  Pulse:  [] 76  Resp:  [16-19] 16  BP: ()/(62-81) 113/71  SpO2:  [96 %-98 %] 97 %  161 lbs 3.2 oz    Constitutional: awake, alert, cooperative, no apparent distress,   and appears stated age  ENT: normocepalic, without obvious abnormality  Hematologic / Lymphatic: no cervical lymphadenopathy  Respiratory: No increased work of breathing, good air exchange,   clear to auscultation bilaterally, no crackles or wheezing  Cardiovascular: normal S1 and S2  GI: No scars, normal bowel sounds, soft, non-distended,   non-tender, no masses palpated, no hepatosplenomegally  Skin: no bruising or bleeding  Musculoskeletal: no lower extremity pitting edema present  Neurologic: Awake, alert, oriented to name, place and time.  Neuropsychiatric: General: normal, calm and normal eye contact    Data   Reviewed     MATTHEW Mitchell CNP 2/22/2021         Partial thromboplastin time   Result Value Ref Range    PTT >240 (HH) 22 - 37 sec   CBC with platelets   Result Value Ref Range     WBC 8.1 4.0 - 11.0 10e9/L    RBC Count 3.90 3.8 - 5.2 10e12/L    Hemoglobin 11.6 (L) 11.7 - 15.7 g/dL    Hematocrit 35.6 35.0 - 47.0 %    MCV 91 78 - 100 fl    MCH 29.7 26.5 - 33.0 pg    MCHC 32.6 31.5 - 36.5 g/dL    RDW 12.9 10.0 - 15.0 %    Platelet Count 312 150 - 450 10e9/L   Potassium   Result Value Ref Range    Potassium 3.8 3.4 - 5.3 mmol/L   Magnesium   Result Value Ref Range    Magnesium 2.2 1.6 - 2.3 mg/dL   Basic metabolic panel   Result Value Ref Range    Sodium 140 133 - 144 mmol/L    Potassium 4.3 3.4 - 5.3 mmol/L    Chloride 109 94 - 109 mmol/L    Carbon Dioxide 28 20 - 32 mmol/L    Anion Gap 3 3 - 14 mmol/L    Glucose 99 70 - 99 mg/dL    Urea Nitrogen 18 7 - 30 mg/dL    Creatinine 0.74 0.52 - 1.04 mg/dL    GFR Estimate 89 >60 mL/min/[1.73_m2]    GFR Estimate If Black >90 >60 mL/min/[1.73_m2]    Calcium 8.7 8.5 - 10.1 mg/dL   CBC with platelets   Result Value Ref Range    WBC 7.4 4.0 - 11.0 10e9/L    RBC Count 4.03 3.8 - 5.2 10e12/L    Hemoglobin 12.1 11.7 - 15.7 g/dL    Hematocrit 36.7 35.0 - 47.0 %    MCV 91 78 - 100 fl    MCH 30.0 26.5 - 33.0 pg    MCHC 33.0 31.5 - 36.5 g/dL    RDW 12.8 10.0 - 15.0 %    Platelet Count 297 150 - 450 10e9/L   TSH with free T4 reflex   Result Value Ref Range    TSH 4.01 (H) 0.40 - 4.00 mU/L   Troponin I   Result Value Ref Range    Troponin I ES 0.179 (HH) 0.000 - 0.045 ug/L   Heparin Unfractionated Anti Xa Level   Result Value Ref Range    Heparin Unfractionated Anti Xa Level 0.51 IU/mL   T4 free   Result Value Ref Range    T4 Free 0.69 (L) 0.76 - 1.46 ng/dL

## 2021-02-22 NOTE — DISCHARGE INSTRUCTIONS
Cardiac Angiogram & Flutter Ablation Discharge Instructions - Femoral & Radial    After you go home:      Have an adult stay with you until tomorrow.    Drink extra fluids for 2 days.    You may resume your normal diet.    No smoking       For 24 hours - due to the sedation you received:    Relax and take it easy.    Do NOT make any important or legal decisions.    Do NOT drive or operate machines at home or at work.    Do NOT drink alcohol.    Care of Wrist & Groin Puncture Sites:      For the first 24 hrs - check the puncture site every 1-2 hours while awake.    For 2 days, when you cough, sneeze, laugh or move your bowels,  hold your hand over the puncture site and press firmly on/above the site    Remove the bandaid after 24 hours. If there is minor oozing, apply another bandaid and remove it after 12 hours.    It is normal to have a small bruise or pea size lump at the site.    You may shower tomorrow. Do NOT take a bath, or use a hot tub or pool for at least 3 days. Do NOT scrub the site. Do not use lotion or powder near the puncture site.    Activity - For 2 days:    Wrist site:      do not use your hand or arm to support your weight (such as rising from a chair)     do not bend your wrist (such as lifting a garage door).    do not lift more than 5 pounds or exercise your arm (such as tennis, golf or bowling).    Do NOT do any heavy activity such as exercise, lifting, or straining.     Groin site:        No stooping or squatting    Do NOT do any heavy activity such as exercise, lifting, or straining.     No housework, yard work or any activity that make you sweat    Do NOT lift more than 10 pounds    Bleeding:    If you start bleeding from the site in your wrist:      Sit down and press firmly on/above the site with your fingers for 10 minutes.     Once bleeding stops, keep your arm still for 2 hours.     Call [facility] as soon as you can.    If you start bleeding from the site in your groin:      Lie down  flat and press firmly on/above the site for 10 minutes.    Once bleeding stops, lay flat for 2 hours.     Call [facility] as soon as you can.    Call 911 right away if you have heavy bleeding or bleeding that does not stop.      Medicines:      Take your medications, including blood thinners, unless your provider tells you not to.      If you have stopped any medicines, check with your provider about when to restart them.    Follow Up Appointments:      Angiogram - Follow up with [facility] Nurse Practitioner at [facility] of patient preference in 7-10 days.    Ablation - see Eliza Echevarria PA-C for Dr. Alamo on 3/29 @ 8:30 AM. We'll do EKG at that appt    Call the clinic if: (Angiogram)      You have increased pain or a large or growing hard lump around the site.    The site is red, swollen, hot or tender.    Blood or fluid is draining from the site.    You have chills or a fever greater than 101 F (38 C).    Your arm or leg feels numb, cool or changes color.    You have hives, a rash or unusual itching.    New pain in the back or belly that you cannot control with Tylenol.    Any questions or concerns.      [facility] [place]:    [phone] [facility] (7 days a week)      Call the clinic if: (Ablation)      You have increased pain or a large or growing hard lump around the site.    The site is red, swollen, hot or tender.    Blood or fluid is draining from the site.    You have chills or a fever greater than 101 F (38 C).    Your leg feels numb, cool or changes color.    Increased pain in the chest and/or groin.    Increased shortness of breath    Chest pain not relieved by Tylenol or Advil    New pain in the back or belly that you cannot control with Tylenol.    Recurrent irregular or fast heart rate (AFlutter) lasting over 2 hours.    Any questions or concerns.    Heart rhythms:    You may have some irregular heartbeats. These feel very strong. They may make you feel that the fast  heart rhythm is going to start again.  Give it time. The irregular beats should occur less often.       [facility]:    [phone] ( 8am-5pm M-F)  Sol[facility] Graham    [phone] option 2 (7 days a week)  on call Cardiologist

## 2021-02-22 NOTE — ED TRIAGE NOTES
"Pt has been experiencing palpitations and \"flutters\" intermittently for over a year. Tonight episode lasted longer than usual and she called EMS. She was in atrial flutter when EMS arrived with HR up to 190. She converted to NSR without intervention of EMS.   "

## 2021-02-22 NOTE — PROGRESS NOTES
Care Suites Post Procedure Note    Patient Information  Name: Shabnam Shukla  Age: 55 year old    Post Procedure  Time patient returned to Care Suites: 1535  Concerns/abnormal assessment: No immediate  If abnormal assessment, provider notified: N/A  Plan/Other: Continue post procedure plan of care.    1730 Report received from Jen to continue monitoring pt.  Right radial site is bruise, no hematoma, pulse is palpable.  Right groin site CDI, no hematoma.  Pulses are palpable.  VS stable.  Monitor shows NSR.  Denies any pain or discomfort.   Dr. Russo is notified and being updated re: pt's condition.  Pt will be transferred to Observation care #1 and will be discharged tomorrow. Pt agrees with plan of care.  Anticipate 4 hours bedrest until 1935 1815 Detailed and hand off report given to [place] walters RN in Observation care unit.    Kenna Garcia RN

## 2021-02-22 NOTE — PHARMACY-ADMISSION MEDICATION HISTORY
Pharmacy Medication History  Admission medication history interview status for the 2/21/2021  admission is complete. See EPIC admission navigator for prior to admission medications     Location of Interview: Phone  Medication history sources: Patient    Significant changes made to the medication list:  Added MVI, B-complex, glucosamine/chondroitin  Clarified doses of citalopram and vit D  Confirmed that pt takes both citalopram and fluoxetine    In the past week, patient estimated taking medication this percent of the time: greater than 90%    Additional medication history information:   none    Medication reconciliation completed by provider prior to medication history? No    Time spent in this activity: 15 minutes    Prior to Admission medications    Medication Sig Last Dose Taking? Auth Provider   citalopram (CELEXA) 40 MG tablet Take 40 mg by mouth daily 2/21/2021 at am Yes Unknown, Entered By History   FLUoxetine (PROZAC) 10 MG capsule Take 20 mg by mouth 2 times daily 2/21/2021 at am Yes Reported, Patient   glucosamine-chondroitin 500-400 MG CAPS per capsule Take 1 capsule by mouth daily 2/21/2021 at am Yes Unknown, Entered By History   multivitamin, therapeutic (THERA-VIT) TABS tablet Take 1 tablet by mouth daily 2/21/2021 at am Yes Unknown, Entered By History   vitamin (B COMPLEX-C) tablet Take 1 tablet by mouth daily 2/21/2021 at am Yes Unknown, Entered By History   vitamin D3 (CHOLECALCIFEROL) 50 mcg (2000 units) tablet Take 1 tablet by mouth daily 2/21/2021 at am Yes Unknown, Entered By History       The information provided in this note is only as accurate as the sources available at the time of update(s)

## 2021-02-22 NOTE — ED NOTES
Bed: ED20  Expected date: 2/21/21  Expected time: 10:23 PM  Means of arrival:   Comments:  533  55F CP/SOB/Fluttered 100, now NSR  2226

## 2021-02-22 NOTE — PROGRESS NOTES
RECEIVING UNIT ED HANDOFF REVIEW    ED Nurse Handoff Report was reviewed by: Miguel Angel Daniel RN on February 22, 2021 at 1:32 AM

## 2021-02-22 NOTE — PROVIDER NOTIFICATION
MD Notification    Notified Person: MD    Notified Person Name: Dr. Velasquez Karan    Notification Date/Time: 2/22/21 0833    Notification Interaction: Amcom    Purpose of Notification: 309: Can we please get parameters for Metoprolol? Thank you! Maria Teresa Chapa RN    Orders Received:    Comments:

## 2021-02-22 NOTE — ED PROVIDER NOTES
History   Chief Complaint:  Palpitations     HPI   Shabnam Shukla is a 55 year old female with history of BELLA, and tobacco abuse who presents with palpitations. She states her palpitations have been ongoing every day for a year and a half but had onset chest pain similar to heart burn tonight with difficulty breathing while laying on her bed. Her chest pain worsens upon exertion such as exercise or climbing up stairs. She denies blood pressure or blood thinner medications. She rarely drinks and quit smoking two months ago. Her mother had multiple heart attack near her age. She denies a history of blood clots or a familial history of blood clots. Denies allergies to medications.Patient request referral for a cardiologist. She has a history of uterine prolapse.     CARDIAC RISK FACTORS  SEX:                Female   Tobacco:            Quit two months ago.  Hypertension:       Negative   Diabetes:           Negative   Lipids:             Negative   Family History:     Mother had multiple heart attacks.   Exercise:           Positive.       Review of Systems   Respiratory: Positive for shortness of breath.    Cardiovascular: Positive for chest pain and palpitations.   All other systems reviewed and are negative.        Allergies:  No known drug allergies    Medications:  Keflex   Citalopram Hydrobromide     Past Medical History:    Hyperplastic Colon Polyp   Lymphocytic Colitis   SCC  Enterocele  Tobacco Abuse   Chronic Insomnia   BELLA     Past Surgical History:    Laparoscopic   MOHS Surgery   Lysis of Adhesions   Salpingectomy   Enterocele Repair   Incontinence Surgery   Hysterectomy     Family History:    Mother - Dementia, Heart Attacks  Father - Hypertension     Social History:  Patient arrives unaccompanied.     Prior smoker    Physical Exam     Patient Vitals for the past 24 hrs:   BP Temp Temp src Pulse Resp SpO2 Height Weight   02/22/21 0158 113/71 97.9  F (36.6  C) Oral 76 16 97 % -- --   02/22/21  "0144 96/67 -- -- 79 16 98 % -- --   02/22/21 0100 98/62 -- -- 77 18 97 % -- --   02/22/21 0030 111/74 -- -- 87 19 97 % -- --   02/21/21 2330 111/78 -- -- 81 16 96 % -- --   02/21/21 2234 127/81 -- -- 102 16 -- 1.6 m (5' 3\") 68 kg (150 lb)       Physical Exam  General: Alert, interactive in mild distress  Head:  Scalp is atraumatic  Eyes:  The pupils are equal, round, and reactive to light    EOM's intact    No scleral icterus  ENT:      Nose:  The external nose is normal  Ears:  External ears are normal  Mouth/Throat: The oropharynx is normal    Mucus membranes are moist      Neck:  Normal range of motion.      There is no rigidity.    Trachea is in the midline         CV:  Tachycardia     No murmur   Resp:  Breath sounds are clear bilaterally    Non-labored, no retractions or accessory muscle use      GI:  Abdomen is soft, no distension, no tenderness.       MS:  Normal strength in all 4 extremities  Skin:  Warm and dry, No rash or lesions noted.  Neuro: Strength 5/5 x4.      GCS: 15  Psych:  Awake. Alert.  Normal affect.      Appropriate interactions.    Emergency Department Course   ECG  ECG taken at 2238, ECG read at 2241  Normal sinus rhythm   Normal ECG    Rate 95 bpm. TN interval 154 ms. QRS duration 78 ms. QT/QTc 344/432 ms. P-R-T axes 71 68 19.     Imaging:  CT Chest Pulmonary Embolism w Contrast:  1.  No evidence for pulmonary emboli.  2.  No evidence for acute pulmonary disease.  Reading per radiology    Laboratory:  CBC: WBC 8.6, HGB 13.4,   CMP: Glucose 106 (H), o/w WNL (Creatinine: 0.71)    Troponin (Collected 2243): <0.015  D Dimer (Collected 2243): 0.6 (H)  PTT: Pending    Heparin Unfractioned Anti Xa Level: Pending  Asymptomatic influenza A/B & SARS-CoV2 (COVID-19) Virus PCR Multiplex: Negative      Emergency Department Course:    Reviewed:  I reviewed nursing notes, past medical history and care everywhere    Assessments:  2040 I obtained history and examined the patient as noted above. "   0050 I rechecked the patient and explained findings.     Consults:   0038   I spoke with Dr. Allison of the hospitalist service regarding patient's presentation, findings, and plan of care.     Interventions:  2312 Aspirin 324 mg PO  0102 Heparin 800 Units/Hr IV  0104 Heparin 4,100 Units IV    Disposition:  The patient was admitted to the hospital under the care of Dr. Allison.     HEART Score  Background  Calculates the overall risk of adverse event in patient's presenting with chest pain.  Based on 5 criteria (each assigned 0-2 points) including suspiciousness of history, EKG, age, risk factors and troponin.    Data  55 year old female  does not have a problem list on file.   reports that she has been smoking. She has never used smokeless tobacco.  family history is not on file.  Lab Results   Component Value Date    TROPI <0.015 02/21/2021     Criteria   0-2 points for each of 5 items (maximum of 10 points):  Score 2- History highly suspicious for coronary syndrome  Score 0- EKG Normal  Score 1- Age 45 to 65 years old  Score 1- One to 2 risk factors for atherosclerotic disease  Score 0- Within normal limits for troponin levels  Interpretation  Risk of adverse outcome  Heart Score: 4  Total Score 4-6- Adverse Outcome Risk 20.3% - Supports admission with standard rule-out management -serial troponins and stress testing        XPHZO-1-Xbhb Score  (calculator)  Background  Calculates overall risk of atrial fibrillation related CVA based on 7 factors: Age>=65-75, CHF, Htn, CVA/TIA, DM, vascular disease, female  Data  55 year old year old female  does not have a problem list on file.  Criteria   Of possible 6 points for 5 possible items  1 point: Female    Interpretation  WEMZJ-3-[id number] Score: 1  CHADS Score 1 (CVA risk >4% per year):  Warfarin or Aspirin        Impression & Plan     Medical Decision Making:  Shabnam Shukla is a 55 year old female was seen and evaluated. The above work-up was undertaken. Troponin is  unremarkable, CT imaging in the chest shows no signs of a PE, pneumonia, pneumothorax, or more concerning illness. EKG from EMS demonstrated atrial flutter with a rapid rate. She spontaneously converted to normal sinus rhythm and her EKG in the emergency department is unremarkable. Given the chest discomfort at rest, concerns for unstable angina, therefore she was initiated on aspirin as well as Heparin. She remains symptom free throughout her stay in the emergency department. However, given her constitution of symptoms she will be brought into the hospital likely cardiology evaluation, possible provocative stress testing, and possible PE evaluation. Patient was informed of all the findings, was agreement with this plan, and was subsequently brought in under the care of Dr. Allison and colleagues for further evaluation and treatment.     Covid-19  Shabnam Shukla was evaluated during a global COVID-19 pandemic, which necessitated consideration that the patient might be at risk for infection with the SARS-CoV-2 virus that causes COVID-19.   Applicable protocols for evaluation were followed during the patient's care. COVID-19 was considered as part of the patient's evaluation. The plan for testing is: a test was obtained during this visit.    Diagnosis:    ICD-10-CM    1. Atrial flutter with rapid ventricular response (H)  I48.92 Troponin I     Comprehensive metabolic panel     Asymptomatic SARS-CoV-2 COVID-19 Virus (Coronavirus) by PCR   2. Chest pain, unspecified type  R07.9      Scribe Disclosure:  Tung HOOK, am serving as a scribe at 10:36 PM on 2/21/2021 to document services personally performed by Shree Bishop MD based on my observations and the provider's statements to me.            Shree Bishop MD  02/22/21 9390

## 2021-02-22 NOTE — PROGRESS NOTES
"[facility] [place]    Medicine Progress Note - Hospitalist Service       Date of Admission:  2/21/2021  Assessment & Plan        Mrs. Shabnam Shukla is a very pleasant 55-year-old female with a past medical history of depression/anxiety, who presented for evaluation of an episode of palpitations associated with chest pain or shortness of breath.  She was found to be in atrial flutter with rapid ventricular rate by EMS, which eventually converted back to sinus rhythm.     Paroxysmal atrial flutter with rapid ventricular response  NSTEMI  She reports that for the last year and a half she was having intermittent episodes of palpitations associated with chest discomfort, shortness of breath, dizziness and feeling that she was going to pass out.  She does have symptoms occurs with exertion and last 30-45 seconds and resolved by itself, but last night she had an episode that occurred at rest and lasted more than 15 minutes.  EMS was called and apparently she was in atrial flutter with rapid ventricular rate, which eventually converted back to normal sinus rhythm spontaneously.  She was in normal sinus rhythm in ER.  She was hemodynamically stable and she did not have any chest pain.  In ER, she was given aspirin 324 mg p.o. and started on heparin drip given concern for unstable angina.  Her troponin is negative and EKG does not show any ischemic changes.      She is admitted to [facility].  She will be monitored on telemetry    TSH is wnl      Troponin peaked at 0.179    We will continue with a baby aspirin and heparin drip at this time    Check Echocardiogram, Cardiology consult requested.  We will keep her n.p.o.     Anticipate coronary angiogram later today     We will start her on low-dose metoprolol 12.5 mg p.o. twice daily.    EP consult requested, Dr. Mays recommends flutter ablation and, \"it is a very high curative rate and can be done this evening after angiogram, provided no " "intervention.\"    History of depression/anxiety.      We will resume her prior to admission Prozac and Celexa        Diet: NPO per Anesthesia Guidelines for Procedure/Surgery Except for: Meds    DVT Prophylaxis: Heparin SQ  Traore Catheter: not present  Code Status: Full Code           Disposition Plan   Expected discharge: 2 - 3 days, recommended to prior living arrangement once cardiac workup is complete..  Entered: Eva Russo MD 02/22/2021, 9:00 AM       The patient's care was discussed with the Bedside Nurse and Patient.    Eva Russo MD  Hospitalist  [facility]  Text Page (7am - 6pm, M-F)      ______________________________________________________________________    Interval History   \"I just want to get so I can work out.\"  Patient relates approximately 2-year history of intermittent palpitations, chest burning, poor exercise tolerance.  She says she is relieved that there is a potential solution to these problems.  Today, she denies chest pain, shortness of breath, no GI complaints.    Data reviewed today: I reviewed all medications, new labs and imaging results over the last 24 hours. I personally reviewed the EKG tracing showing normal sinus rhythm, normal EKG..    Physical Exam   Vital Signs: Temp: 98  F (36.7  C) Temp src: Oral BP: 113/74 Pulse: 63   Resp: 16 SpO2: 97 % O2 Device: None (Room air)    Weight: 161 lbs 3.2 oz  Constitutional: Awake, alert, cooperative, no apparent distress  Respiratory: Clear to auscultation bilaterally, no crackles or wheezing  Cardiovascular: Regular rate and rhythm, normal S1 and S2, and no murmur noted  GI: Normal bowel sounds, soft, non-distended, non-tender  Skin/Integumen: No rashes, no cyanosis, no edema  Other: Mood is very pleasant    Data   Recent Labs   Lab 02/22/21  0636 02/22/21  0214 02/21/21  2243   WBC 7.4 8.1 8.6   HGB 12.1 11.6* 13.4   MCV 91 91 89    312 346     --  138   POTASSIUM 4.3 3.8 3.6   CHLORIDE 109  --  " 105   CO2 28  --  27   BUN 18  --  22   CR 0.74  --  0.71   ANIONGAP 3  --  6   KONRAD 8.7  --  8.6   GLC 99  --  106*   ALBUMIN  --   --  3.9   PROTTOTAL  --   --  7.1   BILITOTAL  --   --  0.3   ALKPHOS  --   --  79   ALT  --   --  28   AST  --   --  18   TROPI 0.179*  --  <0.015     Recent Results (from the past 24 hour(s))   CT Chest Pulmonary Embolism w Contrast    Narrative    EXAM: CT CHEST PULMONARY EMBOLISM W CONTRAST  LOCATION: [facility]  DATE/TIME: 2/21/2021 11:56 PM    INDICATION: PE suspected, low/intermediate prob, positive D-dimer  COMPARISON: None.  TECHNIQUE: CT chest pulmonary angiogram during arterial phase injection of IV contrast. Multiplanar reformats and MIP reconstructions were performed. Dose reduction techniques were used.   CONTRAST: 92mL Isovue-370    FINDINGS:  ANGIOGRAM CHEST: Pulmonary arteries are normal caliber and negative for pulmonary emboli. Thoracic aorta is negative for dissection. No CT evidence of right heart strain.    LUNGS AND PLEURA: Subsegmental atelectasis right lower lobe.    MEDIASTINUM/AXILLAE: Normal.    CORONARY ARTERY CALCIFICATION: None.    UPPER ABDOMEN: Normal.    MUSCULOSKELETAL: Thoracic scoliosis.      Impression    IMPRESSION:  1.  No evidence for pulmonary emboli.  2.  No evidence for acute pulmonary disease.     Medications     heparin 600 Units/hr (02/22/21 6035)     - MEDICATION INSTRUCTIONS -       sodium chloride 100 mL/hr at 02/22/21 0244       aspirin  81 mg Oral Daily     metoprolol tartrate  12.5 mg Oral BID

## 2021-02-22 NOTE — PROGRESS NOTES
Care Suites Post Procedure Note    Patient Information  Name: Shabnam Shukla  Age: 55 year old    Post Procedure  Time patient returned to Care Suites: 1535  Concerns/abnormal assessment: No, ok to discharge from Cardiology standpoint after bedrest completed.  If abnormal assessment, provider notified: N/A  Plan/Other: pt to transfer to OBS til discharge. Change of shift report given to Hector Sanchez RN

## 2021-02-22 NOTE — PRE-PROCEDURE
GENERAL PRE-PROCEDURE:   Procedure:  Coronary angio possible PCI  Date/Time:  2/22/2021 1:41 PM    Written consent obtained?: Yes    Risks and benefits: Risks, benefits and alternatives were discussed    Consent given by:  Patient  Patient states understanding of procedure being performed: Yes    Patient's understanding of procedure matches consent: Yes    Procedure consent matches procedure scheduled: Yes    Appropriately NPO:  Yes  ASA Class:  Class 3- Severe systemic disease, definite functional limitations  Mallampati  :  Grade 1- soft palate, uvula, tonsillar pillars, and posterior pharyngeal wall visible  Lungs:  Other (comment)  Lung exam comment:  Normal resp rate   Heart:  RRR  History & Physical reviewed:  History and physical reviewed and no updates needed  Statement of review:  I have reviewed the lab findings, diagnostic data, medications, and the plan for sedation     Name band;

## 2021-02-23 VITALS
HEIGHT: 63 IN | HEART RATE: 64 BPM | DIASTOLIC BLOOD PRESSURE: 79 MMHG | WEIGHT: 161.7 LBS | BODY MASS INDEX: 28.65 KG/M2 | OXYGEN SATURATION: 96 % | RESPIRATION RATE: 16 BRPM | SYSTOLIC BLOOD PRESSURE: 109 MMHG | TEMPERATURE: 96.5 F

## 2021-02-23 LAB
INTERPRETATION ECG - MUSE: NORMAL
KCT BLD-ACNC: 180 SEC (ref 75–150)

## 2021-02-23 PROCEDURE — 99239 HOSP IP/OBS DSCHRG MGMT >30: CPT | Performed by: INTERNAL MEDICINE

## 2021-02-23 PROCEDURE — 999N000071 HC STATISTIC HEART CATH LAB OR EP LAB

## 2021-02-23 PROCEDURE — 250N000013 HC RX MED GY IP 250 OP 250 PS 637: Performed by: INTERNAL MEDICINE

## 2021-02-23 PROCEDURE — 99232 SBSQ HOSP IP/OBS MODERATE 35: CPT | Performed by: INTERNAL MEDICINE

## 2021-02-23 RX ADMIN — FLUOXETINE 20 MG: 20 CAPSULE ORAL at 08:46

## 2021-02-23 RX ADMIN — CITALOPRAM HYDROBROMIDE 40 MG: 40 TABLET ORAL at 08:46

## 2021-02-23 RX ADMIN — APIXABAN 5 MG: 5 TABLET, FILM COATED ORAL at 08:46

## 2021-02-23 ASSESSMENT — ACTIVITIES OF DAILY LIVING (ADL)
ADLS_ACUITY_SCORE: 15

## 2021-02-23 ASSESSMENT — MIFFLIN-ST. JEOR: SCORE: 1292.6

## 2021-02-23 NOTE — DISCHARGE SUMMARY
"[facility]  Hospitalist Discharge Summary      Date of Admission:  2/21/2021  Date of Discharge:  2/23/2021  Discharging Provider: Eva Russo MD      Discharge Diagnoses   Paroxysmal atrial flutter with rapid ventricular response  Elevated troponin values, likely representing demand ischemia or type II MI  History of depression/anxiety.      Follow-ups Needed After Discharge   Follow-up Appointments     Follow-up and recommended labs and tests       Follow up with Dr. Alamo or partner , at (location with clinic name or   city) [facility] as instructed by them.             Unresulted Labs Ordered in the Past 30 Days of this Admission     No orders found from 1/22/2021 to 2/22/2021.          Discharge Disposition   Discharged to home  Condition at discharge: Stable      Hospital Course   Shabnam Shukla is a 55 year old female who was admitted on 2/21/2021 with c/o palpitations and CP.  EMS reported AFlutter with RVR and spontaneously converted in ER. Dr. Alamo reviewed 2/22 and noted AFlutter with 2;1 conduction and  bpm with likely demand ischemia causing CP.     She was seen by cardiology and had a coronary angiogram that showed minimal, nonocclusive CAD.  She had empiric ablation of CTI (cavotricuspid isthmus) as atrial flutter was not inducible.  Note that her echocardiogram shows normal left atrial size and ejection fraction of 60 to 65%.    She will continue Eliquis 5 mg twice daily for 1 month, then okay to stop.  She should follow-up with electrophysiology in 1 month an appointment was made by them.  She should follow-up with her primary MD regarding TSH which is just above the upper limit of normal.  She should continue tobacco cessation.    Patient still has symptoms of, \"chest burning,\" always present but worse with a deep breath.  Upon reflection, she thinks she has had this symptom since she had a major abdominal surgery.  I advised her it may be worthwhile " seeking outpatient otolaryngology consultation since symptoms occurred after endotracheal intubation.  She is otherwise feeling well and is dismissed home in good condition.       Consultations This Hospital Stay   PHARMACY IP CONSULT  PHARMACY IP CONSULT  CARDIOLOGY IP CONSULT  PHARMACY IP CONSULT  PHARMACY IP CONSULT  PHARMACY IP CONSULT  PHARMACY IP CONSULT  SMOKING CESSATION PROGRAM IP CONSULT    Code Status   Full Code    Time Spent on this Encounter   I, Eva Russo MD, personally saw the patient today and spent greater than 30 minutes discharging this patient.       Eva Russo MD  [facility]  [address] [place] [id number]  Phone: [phone]  ______________________________________________________________________    Physical Exam   Vital Signs: Temp: 96.5  F (35.8  C) Temp src: Oral BP: 109/79 Pulse: 64   Resp: 16 SpO2: 96 % O2 Device: None (Room air) Oxygen Delivery: 2 LPM  Weight: 161 lbs 11.2 oz  I saw and examined the patient on the date of discharge.           Primary Care Physician   [facility]    Discharge Orders      Discharge Order: F/U with Cardiac  SAMUEL      Reason for your hospital stay    You had palpitations and chest discomfort.     Follow-up and recommended labs and tests     Follow up with Dr. Alamo or partner , at (location with clinic name or city) [facility] as instructed by them.     Activity    Your activity upon discharge: activity as tolerated.  Activity restrictions related to coronary angiogram/ablation per Cardiology.     Diet    Follow this diet upon discharge: Orders Placed This Encounter      Advance Diet as Tolerated: Regular Diet Adult       Significant Results and Procedures   Most Recent 3 CBC's:  Recent Labs   Lab Test 02/22/21  0636 02/22/21  0214 02/21/21  2243   WBC 7.4 8.1 8.6   HGB 12.1 11.6* 13.4   MCV 91 91 89    312 346     Most Recent 3 BMP's:  Recent Labs   Lab Test 02/22/21  0636  02/22/21  0214 02/21/21  2243     --  138   POTASSIUM 4.3 3.8 3.6   CHLORIDE 109  --  105   CO2 28  --  27   BUN 18  --  22   CR 0.74  --  0.71   ANIONGAP 3  --  6   KONRAD 8.7  --  8.6   GLC 99  --  106*     Most Recent TSH and T4:  Recent Labs   Lab Test 02/22/21  0636   TSH 4.01*   T4 0.69*   ,   Results for orders placed or performed during the hospital encounter of 02/21/21   CT Chest Pulmonary Embolism w Contrast    Narrative    EXAM: CT CHEST PULMONARY EMBOLISM W CONTRAST  LOCATION: [facility]  DATE/TIME: 2/21/2021 11:56 PM    INDICATION: PE suspected, low/intermediate prob, positive D-dimer  COMPARISON: None.  TECHNIQUE: CT chest pulmonary angiogram during arterial phase injection of IV contrast. Multiplanar reformats and MIP reconstructions were performed. Dose reduction techniques were used.   CONTRAST: 92mL [id number]-370    FINDINGS:  ANGIOGRAM CHEST: Pulmonary arteries are normal caliber and negative for pulmonary emboli. Thoracic aorta is negative for dissection. No CT evidence of right heart strain.    LUNGS AND PLEURA: Subsegmental atelectasis right lower lobe.    MEDIASTINUM/AXILLAE: Normal.    CORONARY ARTERY CALCIFICATION: None.    UPPER ABDOMEN: Normal.    MUSCULOSKELETAL: Thoracic scoliosis.      Impression    IMPRESSION:  1.  No evidence for pulmonary emboli.  2.  No evidence for acute pulmonary disease.   EP Ablation    Narrative    PROCEDURES PERFORMED:  1. Conscious sedation.  2. Cardiac fluoroscopy.  3. Electroanatomical mapping.  4. Electrophysiology study with left atrium recording/pacing via coronary   sinus catheter.  5. Atrial flutter ablation.    INDICATION FOR PROCEDURE: Symptomatic atrial flutter.    HISTORY OF PRESENT ILLNESS:  55-year-old lady without significant medical   history who presents symptomatic typical atrial flutter, resulting in   troponin elevation.  Tachycardia self terminated and coronary angiography   ruled out significant coronary disease.  Due to  symptomatic tachycardia,   she was referred for empiric cavotricuspid isthmus ablation.    Risks and benefits of the procedure were reviewed including but not   limited to arrhythmias, pain, infection, bleeding, skin damage from   ionized radiation, kidney damage, allergic reaction to contrast dye,   injury to the neighboring vascular structures, need for emergent   cardiopulmonary resuscitation or permanent pacemaker implantation, heart   attacks, strokes, and/or death. Alternatives were discussed including   doing nothing.    All questions were answered to patient's satisfaction. The patient wished   to proceed, and consent was signed.    FLUOROSCOPY DATA:  Fluoro time:  2.6 minutes.  Radiation dose (AK): 6.9 mGy.  Dose-area product (DAP): 0.953 Gy.cm2.    PROCEDURE DESCRIPTION: After written informed consent was obtain, the   patient was brought to the EP lab. I determined this patient to be an   appropriate candidate for the planned sedation and procedure and have   reassessed the patient immediately prior to sedation and procedure. After   the usual sterile prep and drape, the right was locally anesthetized, and   venous vascular sheaths were inserted via ultrasound guidance and modified   Seldinger technique (8 [place] x1, 7 [place] Irish x1 and 6 [place] Irish x1).    With routine hemodynamic and electrocardiographic monitoring,   electrophysiology catheters were advanced under fluoroscopic guidance to   the right ventricle, right atrium and coronary sinus position.   Intracardiac electrograms and conduction were measured at rest and with   program stimulation.    Using the [employer] mapping system, an endocardial mapping of the   right atrium was created, and the cavo tricuspid isthmus was localized.    Using an irrigated tip radiofrequency ablation catheter, the isthmus was   ablated.   Bi-directional block across the cavo-tricuspid isthmus was   confirmed after ablation.    All catheters and sheaths were removed and  homeostasis obtained in the EP   lab prior to transfer to the recovery area.    MEASURED DATA:  Pre ablation: Normal sinus rhythm.  RR 1035 milliseconds,    milliseconds, QRS 93 milliseconds,  milliseconds. AH 69 milliseconds   and HV 48 milliseconds.   Post ablation: Normal sinus rhythm.   milliseconds,    milliseconds, QRS 90 milliseconds,  milliseconds. AH 70 milliseconds   and HV 48 milliseconds. Antegrade AV block 390 milliseconds.  Atrial ERP   500/250.    Cavo-tricuspid isthmus time:  Pre ablation: 52 milliseconds.  Post ablation:115 milliseconds.    IMPRESSION:  - Successful cavo-tricuspid isthmus ablation.  - Bidirectional block across the cavo-tricuspid isthmus was confirmed   after ablation.      RECOMMENDATIONS:  1. Please keep patient flat for at least four hours after the procedure.  2. Continue oral anticoagulation for at least a month after procedure.  3. No strenuous activity for 5 days.  4. Follow up in cardiology clinic in a month.    COMPLICATIONS:  No immediate complications.     Juan R Lloyd MD       Echocardiogram Complete    Narrative    [id number]  [id number]  [id number]  [id number]^HECTOR^RAYNE^[facility]  Echocardiography Laboratory  [address] [place] [address]        Name: CHUCK MARIN  MRN: [id number]  : 1965  Study Date: 2021 10:26 AM  Age: 55 yrs  Gender: Female  Patient Location: [facility]  Reason For Study: Aflutter  Ordering Physician: RAYNE YOUNG  Referring Physician: [facility] [place]  Performed By: Erwin Unger RDCS     BSA: 1.8 m2  Height: 63 in  Weight: 161 lb  HR: 65  BP: 111/76 mmHg  _____________________________________________________________________________  __        Procedure  Complete Portable Echo Adult.  _____________________________________________________________________________  __        Interpretation Summary     Left ventricular size, global systolic function, and wall  motion are normal,  estimated LVEF 60-65%.  Right ventricular global function is normal.  No significant valvular abnormalities.     There are no prior studies available for comparison.  _____________________________________________________________________________  __        Left Ventricle  The left ventricle is normal in size. There is normal left ventricular wall  thickness. Left ventricular systolic function is normal. The visual ejection  fraction is estimated at 60-65%. Left ventricular diastolic function is  normal. No regional wall motion abnormalities noted.     Right Ventricle  The right ventricle is normal in structure, function and size.     Atria  Normal left atrial size. Right atrial size is normal.     Mitral Valve  There is trace mitral regurgitation.        Tricuspid Valve  There is trace to mild tricuspid regurgitation. The right ventricular systolic  pressure is approximated at 22.3 mmHg plus the right atrial pressure. Right  ventricle systolic pressure estimate normal.     Aortic Valve  The aortic valve is normal in structure and function. The aortic valve is  trileaflet.     Pulmonic Valve  The pulmonic valve is not well seen, but is grossly normal.     Vessels  The aortic root is normal size. Normal size ascending aorta. The inferior vena  cava was normal in size with preserved respiratory variability.     Pericardium  There is no pericardial effusion.        Rhythm  Sinus rhythm was noted.  _____________________________________________________________________________  __  MMode/2D Measurements & Calculations  RVDd: 3.5 cm  IVSd: 0.72 cm     LVIDd: 4.1 cm  LVIDs: 0.63 cm  LVPWd: 0.85 cm  LVPWs: 1.7 cm  FS: 84.5 %  LV mass(C)d: 93.7 grams  LV mass(C)dI: 53.1 grams/m2  Ao root diam: 3.0 cm  LA dimension: 3.1 cm  asc Aorta Diam: 2.9 cm  LA/Ao: 1.0  LA Volume (BP): 40.0 ml  LA Volume Index (BP): 22.7 ml/m2  RWT: 0.41           Doppler Measurements & Calculations  MV E max verona: 91.8 cm/sec  MV A max  verona: 67.1 cm/sec  MV E/A: 1.4  MV dec time: 0.15 sec  PA acc time: 0.18 sec  TR max verona: 236.0 cm/sec  TR max P.3 mmHg  E/E' av.0  Lateral E/e': 10.3  Medial E/e': 11.6           _____________________________________________________________________________  __           Report approved by: Ed Mancia 2021 11:39 AM      Cardiac Catheterization    Narrative    1.  Minimal non-occlusive coronary disease.  2.  LVEDP is normal, 8mmHg       Discharge Medications   Current Discharge Medication List      START taking these medications    Details   apixaban ANTICOAGULANT (ELIQUIS) 5 MG tablet Take 1 tablet (5 mg) by mouth 2 times daily  Qty: 60 tablet, Refills: 0    Associated Diagnoses: Atrial flutter with rapid ventricular response (H)         CONTINUE these medications which have NOT CHANGED    Details   citalopram (CELEXA) 40 MG tablet Take 40 mg by mouth daily      FLUoxetine (PROZAC) 10 MG capsule Take 20 mg by mouth 2 times daily      glucosamine-chondroitin 500-400 MG CAPS per capsule Take 1 capsule by mouth daily      multivitamin, therapeutic (THERA-VIT) TABS tablet Take 1 tablet by mouth daily      vitamin (B COMPLEX-C) tablet Take 1 tablet by mouth daily      vitamin D3 (CHOLECALCIFEROL) 50 mcg (2000 units) tablet Take 1 tablet by mouth daily           Allergies   No Known Allergies

## 2021-02-23 NOTE — PROGRESS NOTES
Pt A&O, VSS, pt remains in SR, pt out of bed without any bleeding from groin site.  Pt steady on feet. Right groin site soft, CD/I,  R wrist site some ecchymotic area noted not any change since start of my shift.  R wrist has arm board on per pt request. Will continue to monitor.

## 2021-02-23 NOTE — PLAN OF CARE
VSS. A/O. Up independently. Denies pain. Tele NSR. Right groin and radial sites with small amount of ecchymosis. CMS intact. Discharge instructions reviewed with patient. Discharge med reviewed including side effects/administration. All questions answered. Patient verbalizes  understanding of discharge care/plan.

## 2021-02-23 NOTE — PROGRESS NOTES
Pt A&OX4, VSS.  Pt. remains in SR.  Pt. out of bed without any bleeding from groin site.  Pt steady on feet. Right groin site soft, CD/I.  Right wrist site has small ecchymotic area noted.  Right wrist arm board in place per pt. request. Will continue to monitor.

## 2021-02-23 NOTE — PLAN OF CARE
Arrive to floor at about 1845 from [facility]. A&Ox4. VSS on RA. Denies pain. CMS intact. R radial site WDL except for bruising. R groin site WDL, dressing C/D/I. Bedrest until 1935. Voided via bedpan x 1. Regular diet, tolerating. Good PO intake. IV x 2 SL. Plan is to start Eliquis tonight and discharge tomorrow AM. Tele. Nursing will continue to monitor.

## 2021-02-23 NOTE — PROGRESS NOTES
"[facility]    EP Progress Note    Date of Service (when I saw the patient): 02/23/2021     Assessment & Plan   Shabnam Shukla is a 55 year old female who was admitted on 2/21/2021 with c/o palpitations and CP.  EMS reported AFlutter with RVR and spontaneously converted in ER. Dr. Alamo reviewed 2/22 and noted AFlutter with 2;1 conduction and  bpm with likely demand ischemia causing CP. No sig CAD by cath    1. Atrial Flutter -   - Underwent CTI ablation 2/22 via RFV with Dr. Lloyd. No arrhythmias were induced  - Echo 2/22 with nl LA size and EF 60-65%    - Still with c/o chest \"burning,\" always there but worse with deep breath. This is her presenting sx, not worse since ablation. No change with palpation, movement. Non exertional. No h/o \"heartburn\" or relation to food.    - Tele thus far with SR  Component      Latest Ref Rng & Units 2/22/2021 2/22/2021           6:36 AM  6:36 AM   TSH      0.40 - 4.00 mU/L 4.01 (H)    T4 Free      0.76 - 1.46 ng/dL  0.69 (L)      PLAN:   1. OK to discharge home today from EP standpoint   2. Eliquis 5 mg BID x 1 month, then OK to stop. Rx sent to  Pharmacy per pt request   3. Follow-up 1 month - appt made with me/EKG 3/29/2021 in [place]   4. Rec follow-up with PCP for thyroid but will defer to Hospitalist    2. Troponinemia -   - Trop peaked at 0.179  - Sig FHx of premature CAD and angiogram 2/22 via RRA showed minimal, nonocclusive dz  - Echo with nl EF  - CT Chest negative for PE     PLAN:   1. No ASA with Eliquis use   2. Continue tobacco cessation   3. Recommend FLP   4. Defer to General Cardiology - still c/o her presenting discomfort.       Angeline Echevarria PA-C    Interval History   Still with chest \"burning\" worse with deep breath. This is her presenting discomfort, not worse following ablation  No groin site discomfort    Physical Exam   Temp: 96.5  F (35.8  C) Temp src: Oral BP: 109/79 Pulse: 64   Resp: 16 SpO2: 96 % O2 Device: " "None (Room air) Oxygen Delivery: 2 LPM  Vitals:    21 2234 21 0600 21 0559   Weight: 68 kg (150 lb) 73.1 kg (161 lb 3.2 oz) 73.3 kg (161 lb 11.2 oz)     Vital Signs with Ranges  Temp:  [96.5  F (35.8  C)-98  F (36.7  C)] 96.5  F (35.8  C)  Pulse:  [58-73] 64  Resp:  [16] 16  BP: (102-123)/(57-93) 109/79  SpO2:  [94 %-100 %] 96 %  I/O last 3 completed shifts:  In: -   Out: 400 [Urine:400]    Telemetry: NSR    Constitutional: awake, alert, cooperative, no apparent distress, and appears stated age  Respiratory: CTA B. \"Burning\" pain with deep breath, unchanged since admit  Cardiovascular: Regular. No RMG noted  Musculoskeletal: No edema. R groin site without tenderness, bruit or ecchymosis.     Medications     - MEDICATION INSTRUCTIONS -       sodium chloride Stopped (21 1900)       apixaban ANTICOAGULANT  5 mg Oral BID     aspirin  81 mg Oral Daily     citalopram  40 mg Oral Daily     FLUoxetine  20 mg Oral BID     metoprolol tartrate  12.5 mg Oral BID       Data   I personally reviewed the EKG tracing showing SR 60 bpm 2021.  Results for orders placed or performed during the hospital encounter of 21 (from the past 24 hour(s))   Echocardiogram Complete    Narrative    [id number]  [id number]  [id number]  [id number]^HECTOR^RAYNE^[facility]  Echocardiography Laboratory  [address] [place] [address]        Name: CHUCK MARNI  MRN: [id number]  : 1965  Study Date: 2021 10:26 AM  Age: 55 yrs  Gender: Female  Patient Location: [facility]  Reason For Study: Aflutter  Ordering Physician: RAYNE YOUNG  Referring Physician: [facility]  Performed By: Erwin Unger RDCS     BSA: 1.8 m2  Height: 63 in  Weight: 161 lb  HR: 65  BP: 111/76 mmHg  _____________________________________________________________________________  __        Procedure  Complete Portable Echo " Adult.  _____________________________________________________________________________  __        Interpretation Summary     Left ventricular size, global systolic function, and wall motion are normal,  estimated LVEF 60-65%.  Right ventricular global function is normal.  No significant valvular abnormalities.     There are no prior studies available for comparison.  _____________________________________________________________________________  __        Left Ventricle  The left ventricle is normal in size. There is normal left ventricular wall  thickness. Left ventricular systolic function is normal. The visual ejection  fraction is estimated at 60-65%. Left ventricular diastolic function is  normal. No regional wall motion abnormalities noted.     Right Ventricle  The right ventricle is normal in structure, function and size.     Atria  Normal left atrial size. Right atrial size is normal.     Mitral Valve  There is trace mitral regurgitation.        Tricuspid Valve  There is trace to mild tricuspid regurgitation. The right ventricular systolic  pressure is approximated at 22.3 mmHg plus the right atrial pressure. Right  ventricle systolic pressure estimate normal.     Aortic Valve  The aortic valve is normal in structure and function. The aortic valve is  trileaflet.     Pulmonic Valve  The pulmonic valve is not well seen, but is grossly normal.     Vessels  The aortic root is normal size. Normal size ascending aorta. The inferior vena  cava was normal in size with preserved respiratory variability.     Pericardium  There is no pericardial effusion.        Rhythm  Sinus rhythm was noted.  _____________________________________________________________________________  __  MMode/2D Measurements & Calculations  RVDd: 3.5 cm  IVSd: 0.72 cm     LVIDd: 4.1 cm  LVIDs: 0.63 cm  LVPWd: 0.85 cm  LVPWs: 1.7 cm  FS: 84.5 %  LV mass(C)d: 93.7 grams  LV mass(C)dI: 53.1 grams/m2  Ao root diam: 3.0 cm  LA dimension: 3.1 cm  asc  Aorta Diam: 2.9 cm  LA/Ao: 1.0  LA Volume (BP): 40.0 ml  LA Volume Index (BP): 22.7 ml/m2  RWT: 0.41           Doppler Measurements & Calculations  MV E max verona: 91.8 cm/sec  MV A max verona: 67.1 cm/sec  MV E/A: 1.4  MV dec time: 0.15 sec  PA acc time: 0.18 sec  TR max verona: 236.0 cm/sec  TR max P.3 mmHg  E/E' av.0  Lateral E/e': 10.3  Medial E/e': 11.6           _____________________________________________________________________________  __           Report approved by: Ed Mancia 2021 11:39 AM      Troponin I   Result Value Ref Range    Troponin I ES 0.108 (H) 0.000 - 0.045 ug/L   Cardiac Catheterization    Narrative    1.  Minimal non-occlusive coronary disease.  2.  LVEDP is normal, 8mmHg   Activated clotting time POCT   Result Value Ref Range    Activated Clot Time 180 (H) 75 - 150 sec   EP Ablation    Narrative    PROCEDURES PERFORMED:  1. Conscious sedation.  2. Cardiac fluoroscopy.  3. Electroanatomical mapping.  4. Electrophysiology study with left atrium recording/pacing via coronary   sinus catheter.  5. Atrial flutter ablation.    INDICATION FOR PROCEDURE: Symptomatic atrial flutter.    HISTORY OF PRESENT ILLNESS:  55-year-old lady without significant medical   history who presents symptomatic typical atrial flutter, resulting in   troponin elevation.  Tachycardia self terminated and coronary angiography   ruled out significant coronary disease.  Due to symptomatic tachycardia,   she was referred for empiric cavotricuspid isthmus ablation.    Risks and benefits of the procedure were reviewed including but not   limited to arrhythmias, pain, infection, bleeding, skin damage from   ionized radiation, kidney damage, allergic reaction to contrast dye,   injury to the neighboring vascular structures, need for emergent   cardiopulmonary resuscitation or permanent pacemaker implantation, heart   attacks, strokes, and/or death. Alternatives were discussed including   doing nothing.    All  questions were answered to patient's satisfaction. The patient wished   to proceed, and consent was signed.    FLUOROSCOPY DATA:  Fluoro time:  2.6 minutes.  Radiation dose (AK): 6.9 mGy.  Dose-area product (DAP): 0.953 Gy.cm2.    PROCEDURE DESCRIPTION: After written informed consent was obtain, the   patient was brought to the EP lab. I determined this patient to be an   appropriate candidate for the planned sedation and procedure and have   reassessed the patient immediately prior to sedation and procedure. After   the usual sterile prep and drape, the right was locally anesthetized, and   venous vascular sheaths were inserted via ultrasound guidance and modified   Seldinger technique (8 [place] citizen x1, 7 [place] citizen x1 and 6 [place] citizen x1).    With routine hemodynamic and electrocardiographic monitoring,   electrophysiology catheters were advanced under fluoroscopic guidance to   the right ventricle, right atrium and coronary sinus position.   Intracardiac electrograms and conduction were measured at rest and with   program stimulation.    Using the [employer] mapping system, an endocardial mapping of the   right atrium was created, and the cavo tricuspid isthmus was localized.    Using an irrigated tip radiofrequency ablation catheter, the isthmus was   ablated.   Bi-directional block across the cavo-tricuspid isthmus was   confirmed after ablation.    All catheters and sheaths were removed and homeostasis obtained in the EP   lab prior to transfer to the recovery area.    MEASURED DATA:  Pre ablation: Normal sinus rhythm.  RR 1035 milliseconds,    milliseconds, QRS 93 milliseconds,  milliseconds. AH 69 milliseconds   and HV 48 milliseconds.   Post ablation: Normal sinus rhythm.   milliseconds,    milliseconds, QRS 90 milliseconds,  milliseconds. AH 70 milliseconds   and HV 48 milliseconds. Antegrade AV block 390 milliseconds.  Atrial ERP   500/250.    Cavo-tricuspid isthmus time:  Pre  ablation: 52 milliseconds.  Post ablation:115 milliseconds.    IMPRESSION:  - Successful cavo-tricuspid isthmus ablation.  - Bidirectional block across the cavo-tricuspid isthmus was confirmed   after ablation.      RECOMMENDATIONS:  1. Please keep patient flat for at least four hours after the procedure.  2. Continue oral anticoagulation for at least a month after procedure.  3. No strenuous activity for 5 days.  4. Follow up in cardiology clinic in a month.    COMPLICATIONS:  No immediate complications.     Juan R Lloyd MD       EKG 12-lead, tracing only   Result Value Ref Range    Interpretation ECG Click View Image link to view waveform and result

## 2021-02-24 ENCOUNTER — TELEPHONE (OUTPATIENT)
Dept: CARDIOLOGY | Facility: CLINIC | Age: 56
End: 2021-02-24

## 2021-02-24 NOTE — TELEPHONE ENCOUNTER
"02/24/21 Pt called with questions about returning to work post heart cath and Aflutter Ablation done on 2/22. 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  Reviewed activity restrictions x 3 days including limiting the amt of bending, stairs and lifting anything > 10 lbs.  Encouraged to slowly increase activity after that and do what feels good.   Reminded pt of follow up on 3/29/21 with Eliza Echevarria Pa-C but to call if questions/issues prior.  Pt voiced understanding and agreement with plan.   Jose Luis 1230 pm  "

## 2021-02-24 NOTE — TELEPHONE ENCOUNTER
"Patient was evaluated by cardiology while inpatient for CP and palpitations. S/p cath (minimal CAD.. s/p CTI ablation 2/22. No arrhythmias induced) . Called patient and left  to discuss any post hospital d/c questions she may have, review medication changes, and confirm f/u appts. RN advised patient in  that she has an apt scheduled on 3/29/21 with INDIO Eliza. Patient advised in  to call clinic with any cardiac related questions or concerns prior to this scheduled indio't.                 2/23/21 cardiology progress note  Assessment & Plan     Shabnam Shukla is a 55 year old female who was admitted on 2/21/2021 with c/o palpitations and CP.  EMS reported AFlutter with RVR and spontaneously converted in ER. Dr. Alamo reviewed 2/22 and noted AFlutter with 2;1 conduction and  bpm with likely demand ischemia causing CP. No sig CAD by cath     1. Atrial Flutter -   - Underwent CTI ablation 2/22 via RFV with Dr. Lloyd. No arrhythmias were induced  - Echo 2/22 with nl LA size and EF 60-65%     - Still with c/o chest \"burning,\" always there but worse with deep breath. This is her presenting sx, not worse since ablation. No change with palpation, movement. Non exertional. No h/o \"heartburn\" or relation to food.     - Tele thus far with SR  Component      Latest Ref Rng & Units 2/22/2021 2/22/2021           6:36 AM  6:36 AM   TSH      0.40 - 4.00 mU/L 4.01 (H)     T4 Free      0.76 - 1.46 ng/dL   0.69 (L)                   PLAN:               1. OK to discharge home today from EP standpoint               2. Eliquis 5 mg BID x 1 month, then OK to stop. Rx sent to  Pharmacy per pt request               3. Follow-up 1 month - appt made with me/EKG 3/29/2021 in [place]               4. Rec follow-up with PCP for thyroid but will defer to Hospitalist     2. Troponinemia -   - Trop peaked at 0.179  - Sig FHx of premature CAD and angiogram 2/22 via RRA showed minimal, nonocclusive dz  - Echo with nl EF  - CT Chest negative " for PE                  PLAN:               1. No ASA with Eliquis use               2. Continue tobacco cessation               3. Recommend FLP               4. Defer to General Cardiology - still c/o her presenting discomfort.         Angeline Echevarria PA-C

## 2021-03-01 ENCOUNTER — TELEPHONE (OUTPATIENT)
Dept: CARDIOLOGY | Facility: CLINIC | Age: 56
End: 2021-03-01

## 2021-03-01 NOTE — TELEPHONE ENCOUNTER
Patient called to report she felt lump in right groin post procedure.  Denies pain reports area is approximately size of nickel   Informed patient that feeling a nodule at insertion site is normal and will go away after a few weeks.   Recommended that she continue to monitor this and if it should get larger to contact us.  Patient provided verbal understanding.  RAZ Akers

## 2021-03-16 PROBLEM — F41.9 ANXIETY: Status: ACTIVE | Noted: 2021-03-16

## 2021-03-16 PROBLEM — Z72.0 TOBACCO ABUSE: Status: ACTIVE | Noted: 2021-03-16

## 2021-03-19 NOTE — PROGRESS NOTES
"[facility]      Assessment & Plan   Problem List Items Addressed This Visit     Atrial flutter with rapid ventricular response (H) - Primary    Relevant Orders    EKG 12-lead complete w/read - Clinics (future- to be scheduled) (Completed)         I had the pleasure of seeing Shabnam when she came for follow up of recent hospitalization.  This 55 year old saw Dr. Alamo & Dr. Starkey in consultation for her history of:    1. Atrial Flutter - s/p CTI ablation 2/22/2021  2. Minimal, non-occlusive CAD - noted on cath 2/2021 on angiogram done d/t troponin increase in setting of rapid AFlutter (demand ischemia)      Shabnam was admitted 2/21 with c/o palpitations and CP. EMS reported AFlutter with RVR. In the ER, spontaneously converted to SR.  Troponin was 0.179 and given her significant family hx of premature CAD, she underwent angiogram showing minimal, nonocclusive dz. She underwent empiric CTI ablation 2/22 with Dr. Lloyd after angiogram showed no sig lesions.  Of note, her TSH was high and T4 was slightly low.  She was discharged on 2/23/2021 on Eliquis 5 mg BID x 1 month. No other med changes made and for her constant \"chest burning\" it was rec'd see ENT, as sxs seemed to start after intubation.    She did contact us ~1 week later and noted a \"lump\" at groin site. Continued monitoring was rec'd.     Interval History:  She's doing \"really well.\" No recurrent palpitations. R groin site without any issues. No complaints of chest pain, pressure or tightness but has had some \"burning\" which she came in with. It was felt this was d/t previous surgery/intubation. She notes she does have some trouble swallowing, even water.    No orthopnea, PND or edema. No change in exercise tolerance or breathing. Overall, she is feeling well.     No issues with bleeding on the Eliquis. Takes last dose tonight.    VITALS:  Vitals: /79   Pulse 76   Ht 1.626 m (5' 4\")   Wt 73.3 kg (161 lb 8 oz)   BMI 27.72 kg/m  " "    Diagnostic Testing:  EKG today, which I overread, showed SR 75 bpm.  Echocardiogram 2/22/2021 - EF 60-65%. No RWMA. Nl RV. Trace MR. Trace-mild TR with RVSP nl 22+RAP.   Angiogram 2/22/2021 - minimal non-occlusive CAD. Nl LVEDP    Plan:  1. OK to stop Eliquis when Rx complete  2. No EP follow-up scheduled    Assessment/Plan:    1. Atrial Flutter    Presented with palpitations; EMS showed AFlutter, appeared typical and underwent empiric ablation 2/22 with Dr. Lloyd    EKG today, as above, shows SR    On Eliquis x 1 month; will stop after tonight's dose    PLAN:    No EP follow-up planned but recommended to contact us for recurrent palpitations    Follow-up PCP for thyroid abnls      2. Troponinemia    Trop bumped to 0.179 on admit    Echo with nl EF and cath with nonocclusive, minimal dz    Thought d/t demand ischemia     PLAN:    Continue RF modification; Lipids through PCP    For episodes of chest \"burning\" rec'd to see ENT        Eliza Echevarria PA-C, MSPAS      Orders Placed This Encounter   Procedures     EKG 12-lead complete w/read - Clinics (future- to be scheduled)     No orders of the defined types were placed in this encounter.    Medications Discontinued During This Encounter   Medication Reason     apixaban ANTICOAGULANT (ELIQUIS) 5 MG tablet Therapy completed         Encounter Diagnosis   Name Primary?     Atrial flutter with rapid ventricular response (H) Yes       CURRENT MEDICATIONS:  Current Outpatient Medications   Medication Sig Dispense Refill     citalopram (CELEXA) 40 MG tablet Take 40 mg by mouth daily       FLUoxetine (PROZAC) 10 MG capsule Take 20 mg by mouth 2 times daily       glucosamine-chondroitin 500-400 MG CAPS per capsule Take 1 capsule by mouth daily       multivitamin, therapeutic (THERA-VIT) TABS tablet Take 1 tablet by mouth daily       vitamin (B COMPLEX-C) tablet Take 1 tablet by mouth daily       vitamin D3 (CHOLECALCIFEROL) 50 mcg (2000 units) tablet Take 1 tablet by mouth " "daily         ALLERGIES   No Known Allergies      Review of Systems:  Skin:  Negative     Eyes:  Negative    ENT:  Negative    Respiratory:  Negative for shortness of breath;dyspnea on exertion;cough  Cardiovascular:  Negative for;palpitations;chest pain;edema;dizziness;lightheadedness;fatigue    Gastroenterology: Negative    Genitourinary:  Negative    Musculoskeletal:  Negative    Neurologic:  Negative    Psychiatric:  Positive for anxiety;depression  Heme/Lymph/Imm:  Negative    Endocrine:  Negative      Physical Exam:  Vitals: /79   Pulse 76   Ht 1.626 m (5' 4\")   Wt 73.3 kg (161 lb 8 oz)   BMI 27.72 kg/m      Constitutional:  cooperative, alert and oriented, well developed, well nourished, in no acute distress        Skin:  warm and dry to the touch, no apparent skin lesions or masses noted        Head:  normocephalic, no masses or lesions        Eyes:  pupils equal and round;conjunctivae and lids unremarkable;sclera white        ENT:  not assessed this visit        Neck:  carotid pulses are full and equal bilaterally, JVP normal, no carotid bruit        Chest:  normal breath sounds, clear to auscultation, normal A-P diameter, normal symmetry, normal respiratory excursion, no use of accessory muscles        Cardiac: regular rhythm     no presence of murmur            Abdomen:  abdomen soft        Vascular: pulses full and equal                                      Extremities and Back:  no edema;no deformities, clubbing, cyanosis, erythema observed        Neurological:  no gross motor deficits            PAST MEDICAL HISTORY:  Past Medical History:   Diagnosis Date     Tobacco abuse 3/16/2021       PAST SURGICAL HISTORY:  Past Surgical History:   Procedure Laterality Date     CV CORONARY ANGIOGRAM N/A 2/22/2021    Procedure: Coronary Angiogram;  Surgeon: Lucille Francis MD;  Location:  HEART CARDIAC CATH LAB     CV LEFT HEART CATH N/A 2/22/2021    Procedure: Left Heart Cath;  Surgeon: Tito" MD Lucille;  Location:  HEART CARDIAC CATH LAB     EP ABLATION ATRIAL FLUTTER N/A 2/22/2021    Procedure: EP Ablation Atrial Flutter;  Surgeon: Juan R Lloyd MD;  Location:  HEART CARDIAC CATH LAB       FAMILY HISTORY:  History reviewed. No pertinent family history.    SOCIAL HISTORY:  Social History     Socioeconomic History     Marital status:      Spouse name: None     Number of children: None     Years of education: None     Highest education level: None   Occupational History     None   Social Needs     Financial resource strain: None     Food insecurity     Worry: None     Inability: None     Transportation needs     Medical: None     Non-medical: None   Tobacco Use     Smoking status: Current Some Day Smoker     Smokeless tobacco: Never Used   Substance and Sexual Activity     Alcohol use: None     Drug use: None     Sexual activity: None   Lifestyle     Physical activity     Days per week: None     Minutes per session: None     Stress: None   Relationships     Social connections     Talks on phone: None     Gets together: None     Attends [employer] service: None     Active member of club or organization: None     Attends meetings of clubs or organizations: None     Relationship status: None     Intimate partner violence     Fear of current or ex partner: None     Emotionally abused: None     Physically abused: None     Forced sexual activity: None   Other Topics Concern     None   Social History Narrative     None

## 2021-03-27 ENCOUNTER — HEALTH MAINTENANCE LETTER (OUTPATIENT)
Age: 56
End: 2021-03-27

## 2021-03-29 ENCOUNTER — OFFICE VISIT (OUTPATIENT)
Dept: CARDIOLOGY | Facility: CLINIC | Age: 56
End: 2021-03-29
Payer: COMMERCIAL

## 2021-03-29 VITALS
SYSTOLIC BLOOD PRESSURE: 118 MMHG | WEIGHT: 161.5 LBS | DIASTOLIC BLOOD PRESSURE: 79 MMHG | BODY MASS INDEX: 27.57 KG/M2 | HEIGHT: 64 IN | HEART RATE: 76 BPM

## 2021-03-29 DIAGNOSIS — I48.92 ATRIAL FLUTTER WITH RAPID VENTRICULAR RESPONSE (H): Primary | ICD-10-CM

## 2021-03-29 PROCEDURE — 93000 ELECTROCARDIOGRAM COMPLETE: CPT | Performed by: PHYSICIAN ASSISTANT

## 2021-03-29 PROCEDURE — 99214 OFFICE O/P EST MOD 30 MIN: CPT | Performed by: PHYSICIAN ASSISTANT

## 2021-03-29 ASSESSMENT — MIFFLIN-ST. JEOR: SCORE: 1307.56

## 2021-03-29 NOTE — PATIENT INSTRUCTIONS
Shabnam - it was good to see you today!    1. Reviewed angiogram showing no significant blockages  2. EKG today looked great  3. Reviewed echo showing normal heart strength and valves  4. Reviewed ablation which went really well    PLAN:  1. OK to stop the Eliquis when it's complete  2. No heart follow-up needed but CALL if issues! [phone]

## 2021-03-29 NOTE — LETTER
"3/29/2021    [facility], [place]  [address] [address]    RE: Shabnam Shukla       Dear Colleague,    I had the pleasure of seeing Shabnam Shukla in the [facility].    [facility]      Assessment & Plan   Problem List Items Addressed This Visit     Atrial flutter with rapid ventricular response (H) - Primary    Relevant Orders    EKG 12-lead complete w/read - Clinics (future- to be scheduled) (Completed)         I had the pleasure of seeing Shabnam when she came for follow up of recent hospitalization.  This 55 year old saw Dr. Alamo & Dr. Starkey in consultation for her history of:    1. Atrial Flutter - s/p CTI ablation 2/22/2021  2. Minimal, non-occlusive CAD - noted on cath 2/2021 on angiogram done d/t troponin increase in setting of rapid AFlutter (demand ischemia)      Shabnam was admitted 2/21 with c/o palpitations and CP. EMS reported AFlutter with RVR. In the ER, spontaneously converted to SR.  Troponin was 0.179 and given her significant family hx of premature CAD, she underwent angiogram showing minimal, nonocclusive dz. She underwent empiric CTI ablation 2/22 with Dr. Lloyd after angiogram showed no sig lesions.  Of note, her TSH was high and T4 was slightly low.  She was discharged on 2/23/2021 on Eliquis 5 mg BID x 1 month. No other med changes made and for her constant \"chest burning\" it was rec'd see ENT, as sxs seemed to start after intubation.    She did contact us ~1 week later and noted a \"lump\" at groin site. Continued monitoring was rec'd.     Interval History:  She's doing \"really well.\" No recurrent palpitations. R groin site without any issues. No complaints of chest pain, pressure or tightness but has had some \"burning\" which she came in with. It was felt this was d/t previous surgery/intubation. She notes she does have some trouble swallowing, even water.    No orthopnea, PND or edema. No " "change in exercise tolerance or breathing. Overall, she is feeling well.     No issues with bleeding on the Eliquis. Takes last dose tonight.    VITALS:  Vitals: /79   Pulse 76   Ht 1.626 m (5' 4\")   Wt 73.3 kg (161 lb 8 oz)   BMI 27.72 kg/m      Diagnostic Testing:  EKG today, which I overread, showed SR 75 bpm.  Echocardiogram 2/22/2021 - EF 60-65%. No RWMA. Nl RV. Trace MR. Trace-mild TR with RVSP nl 22+RAP.   Angiogram 2/22/2021 - minimal non-occlusive CAD. Nl LVEDP    Plan:  1. OK to stop Eliquis when Rx complete  2. No EP follow-up scheduled    Assessment/Plan:    1. Atrial Flutter    Presented with palpitations; EMS showed AFlutter, appeared typical and underwent empiric ablation 2/22 with Dr. Lloyd    EKG today, as above, shows SR    On Eliquis x 1 month; will stop after tonight's dose    PLAN:    No EP follow-up planned but recommended to contact us for recurrent palpitations    Follow-up PCP for thyroid abnls      2. Troponinemia    Trop bumped to 0.179 on admit    Echo with nl EF and cath with nonocclusive, minimal dz    Thought d/t demand ischemia     PLAN:    Continue RF modification; Lipids through PCP    For episodes of chest \"burning\" rec'd to see ENT        Eliza Echevarria PA-C, MSPAS      Orders Placed This Encounter   Procedures     EKG 12-lead complete w/read - Clinics (future- to be scheduled)     No orders of the defined types were placed in this encounter.    Medications Discontinued During This Encounter   Medication Reason     apixaban ANTICOAGULANT (ELIQUIS) 5 MG tablet Therapy completed         Encounter Diagnosis   Name Primary?     Atrial flutter with rapid ventricular response (H) Yes       CURRENT MEDICATIONS:  Current Outpatient Medications   Medication Sig Dispense Refill     citalopram (CELEXA) 40 MG tablet Take 40 mg by mouth daily       FLUoxetine (PROZAC) 10 MG capsule Take 20 mg by mouth 2 times daily       glucosamine-chondroitin 500-400 MG CAPS per capsule Take 1 capsule " "by mouth daily       multivitamin, therapeutic (THERA-VIT) TABS tablet Take 1 tablet by mouth daily       vitamin (B COMPLEX-C) tablet Take 1 tablet by mouth daily       vitamin D3 (CHOLECALCIFEROL) 50 mcg (2000 units) tablet Take 1 tablet by mouth daily         ALLERGIES   No Known Allergies      Review of Systems:  Skin:  Negative     Eyes:  Negative    ENT:  Negative    Respiratory:  Negative for shortness of breath;dyspnea on exertion;cough  Cardiovascular:  Negative for;palpitations;chest pain;edema;dizziness;lightheadedness;fatigue    Gastroenterology: Negative    Genitourinary:  Negative    Musculoskeletal:  Negative    Neurologic:  Negative    Psychiatric:  Positive for anxiety;depression  Heme/Lymph/Imm:  Negative    Endocrine:  Negative      Physical Exam:  Vitals: /79   Pulse 76   Ht 1.626 m (5' 4\")   Wt 73.3 kg (161 lb 8 oz)   BMI 27.72 kg/m      Constitutional:  cooperative, alert and oriented, well developed, well nourished, in no acute distress        Skin:  warm and dry to the touch, no apparent skin lesions or masses noted        Head:  normocephalic, no masses or lesions        Eyes:  pupils equal and round;conjunctivae and lids unremarkable;sclera white        ENT:  not assessed this visit        Neck:  carotid pulses are full and equal bilaterally, JVP normal, no carotid bruit        Chest:  normal breath sounds, clear to auscultation, normal A-P diameter, normal symmetry, normal respiratory excursion, no use of accessory muscles        Cardiac: regular rhythm     no presence of murmur            Abdomen:  abdomen soft        Vascular: pulses full and equal                                      Extremities and Back:  no edema;no deformities, clubbing, cyanosis, erythema observed        Neurological:  no gross motor deficits            PAST MEDICAL HISTORY:  Past Medical History:   Diagnosis Date     Tobacco abuse 3/16/2021       PAST SURGICAL HISTORY:  Past Surgical History:   Procedure " Laterality Date     CV CORONARY ANGIOGRAM N/A 2/22/2021    Procedure: Coronary Angiogram;  Surgeon: Lucille Francis MD;  Location:  HEART CARDIAC CATH LAB     CV LEFT HEART CATH N/A 2/22/2021    Procedure: Left Heart Cath;  Surgeon: Lucille Francis MD;  Location:  HEART CARDIAC CATH LAB     EP ABLATION ATRIAL FLUTTER N/A 2/22/2021    Procedure: EP Ablation Atrial Flutter;  Surgeon: Juan R Lloyd MD;  Location:  HEART CARDIAC CATH LAB       FAMILY HISTORY:  History reviewed. No pertinent family history.    SOCIAL HISTORY:  Social History     Socioeconomic History     Marital status:      Spouse name: None     Number of children: None     Years of education: None     Highest education level: None   Occupational History     None   Social Needs     Financial resource strain: None     Food insecurity     Worry: None     Inability: None     Transportation needs     Medical: None     Non-medical: None   Tobacco Use     Smoking status: Current Some Day Smoker     Smokeless tobacco: Never Used   Substance and Sexual Activity     Alcohol use: None     Drug use: None     Sexual activity: None   Lifestyle     Physical activity     Days per week: None     Minutes per session: None     Stress: None   Relationships     Social connections     Talks on phone: None     Gets together: None     Attends Gnosticism service: None     Active member of club or organization: None     Attends meetings of clubs or organizations: None     Relationship status: None     Intimate partner violence     Fear of current or ex partner: None     Emotionally abused: None     Physically abused: None     Forced sexual activity: None   Other Topics Concern     None   Social History Narrative     None       Thank you for allowing me to participate in the care of your patient.      Sincerely,     JAKE Lindsey [facility]    cc:   Angeline Echevarria,  JAKE  [address]  [place] [id number]

## 2021-09-05 ENCOUNTER — HEALTH MAINTENANCE LETTER (OUTPATIENT)
Age: 56
End: 2021-09-05

## 2022-02-28 ENCOUNTER — APPOINTMENT (OUTPATIENT)
Dept: GENERAL RADIOLOGY | Facility: CLINIC | Age: 57
End: 2022-02-28
Attending: EMERGENCY MEDICINE
Payer: COMMERCIAL

## 2022-02-28 ENCOUNTER — HOSPITAL ENCOUNTER (EMERGENCY)
Facility: CLINIC | Age: 57
Discharge: HOME | End: 2022-02-28
Attending: EMERGENCY MEDICINE | Admitting: EMERGENCY MEDICINE
Payer: COMMERCIAL

## 2022-02-28 VITALS
TEMPERATURE: 97.8 F | SYSTOLIC BLOOD PRESSURE: 109 MMHG | OXYGEN SATURATION: 95 % | RESPIRATION RATE: 15 BRPM | HEART RATE: 78 BPM | BODY MASS INDEX: 26.58 KG/M2 | WEIGHT: 150 LBS | HEIGHT: 63 IN | DIASTOLIC BLOOD PRESSURE: 78 MMHG

## 2022-02-28 DIAGNOSIS — R06.09 DYSPNEA ON EXERTION: ICD-10-CM

## 2022-02-28 DIAGNOSIS — R00.2 PALPITATIONS: ICD-10-CM

## 2022-02-28 LAB
ALBUMIN SERPL-MCNC: 3.8 G/DL (ref 3.4–5)
ALP SERPL-CCNC: 56 U/L (ref 40–150)
ALT SERPL W P-5'-P-CCNC: 29 U/L (ref 0–50)
ANION GAP SERPL CALCULATED.3IONS-SCNC: 6 MMOL/L (ref 3–14)
AST SERPL W P-5'-P-CCNC: 29 U/L (ref 0–45)
ATRIAL RATE - MUSE: 83 BPM
BASOPHILS # BLD AUTO: 0.1 10E3/UL (ref 0–0.2)
BASOPHILS NFR BLD AUTO: 1 %
BILIRUB SERPL-MCNC: 0.3 MG/DL (ref 0.2–1.3)
BUN SERPL-MCNC: 13 MG/DL (ref 7–30)
CALCIUM SERPL-MCNC: 8.8 MG/DL (ref 8.5–10.1)
CHLORIDE BLD-SCNC: 106 MMOL/L (ref 94–109)
CO2 SERPL-SCNC: 25 MMOL/L (ref 20–32)
CREAT SERPL-MCNC: 0.92 MG/DL (ref 0.52–1.04)
D DIMER PPP FEU-MCNC: 0.46 UG/ML FEU (ref 0–0.5)
DIASTOLIC BLOOD PRESSURE - MUSE: NORMAL MMHG
EOSINOPHIL # BLD AUTO: 0.2 10E3/UL (ref 0–0.7)
EOSINOPHIL NFR BLD AUTO: 2 %
ERYTHROCYTE [DISTWIDTH] IN BLOOD BY AUTOMATED COUNT: 12.7 % (ref 10–15)
GFR SERPL CREATININE-BSD FRML MDRD: 72 ML/MIN/1.73M2
GLUCOSE BLD-MCNC: 101 MG/DL (ref 70–99)
HCT VFR BLD AUTO: 42.2 % (ref 35–47)
HGB BLD-MCNC: 14.1 G/DL (ref 11.7–15.7)
HOLD SPECIMEN: NORMAL
IMM GRANULOCYTES # BLD: 0 10E3/UL
IMM GRANULOCYTES NFR BLD: 0 %
INTERPRETATION ECG - MUSE: NORMAL
LYMPHOCYTES # BLD AUTO: 3.3 10E3/UL (ref 0.8–5.3)
LYMPHOCYTES NFR BLD AUTO: 43 %
MCH RBC QN AUTO: 30.9 PG (ref 26.5–33)
MCHC RBC AUTO-ENTMCNC: 33.4 G/DL (ref 31.5–36.5)
MCV RBC AUTO: 93 FL (ref 78–100)
MONOCYTES # BLD AUTO: 0.9 10E3/UL (ref 0–1.3)
MONOCYTES NFR BLD AUTO: 12 %
NEUTROPHILS # BLD AUTO: 3.2 10E3/UL (ref 1.6–8.3)
NEUTROPHILS NFR BLD AUTO: 42 %
NRBC # BLD AUTO: 0 10E3/UL
NRBC BLD AUTO-RTO: 0 /100
P AXIS - MUSE: 72 DEGREES
PLATELET # BLD AUTO: 308 10E3/UL (ref 150–450)
POTASSIUM BLD-SCNC: 4.4 MMOL/L (ref 3.4–5.3)
PR INTERVAL - MUSE: 158 MS
PROT SERPL-MCNC: 7.7 G/DL (ref 6.8–8.8)
QRS DURATION - MUSE: 76 MS
QT - MUSE: 352 MS
QTC - MUSE: 413 MS
R AXIS - MUSE: 63 DEGREES
RBC # BLD AUTO: 4.56 10E6/UL (ref 3.8–5.2)
SODIUM SERPL-SCNC: 137 MMOL/L (ref 133–144)
SYSTOLIC BLOOD PRESSURE - MUSE: NORMAL MMHG
T AXIS - MUSE: 43 DEGREES
TROPONIN I SERPL HS-MCNC: 5 NG/L
VENTRICULAR RATE- MUSE: 83 BPM
WBC # BLD AUTO: 7.7 10E3/UL (ref 4–11)

## 2022-02-28 PROCEDURE — 84484 ASSAY OF TROPONIN QUANT: CPT | Performed by: EMERGENCY MEDICINE

## 2022-02-28 PROCEDURE — 93005 ELECTROCARDIOGRAM TRACING: CPT

## 2022-02-28 PROCEDURE — 85379 FIBRIN DEGRADATION QUANT: CPT | Performed by: EMERGENCY MEDICINE

## 2022-02-28 PROCEDURE — 85025 COMPLETE CBC W/AUTO DIFF WBC: CPT | Performed by: EMERGENCY MEDICINE

## 2022-02-28 PROCEDURE — 80053 COMPREHEN METABOLIC PANEL: CPT | Performed by: EMERGENCY MEDICINE

## 2022-02-28 PROCEDURE — 36415 COLL VENOUS BLD VENIPUNCTURE: CPT | Performed by: EMERGENCY MEDICINE

## 2022-02-28 PROCEDURE — 71046 X-RAY EXAM CHEST 2 VIEWS: CPT

## 2022-02-28 PROCEDURE — 99285 EMERGENCY DEPT VISIT HI MDM: CPT | Mod: 25

## 2022-02-28 ASSESSMENT — ENCOUNTER SYMPTOMS
VOMITING: 0
CHEST TIGHTNESS: 1
PALPITATIONS: 1
FEVER: 0
DIARRHEA: 0
BLOOD IN STOOL: 0
COUGH: 0

## 2022-02-28 NOTE — ED NOTES
Assumed care of patient from RAZ Gomez. Patient back on monitor, lab will run D-dimer off existing specimen.   no

## 2022-02-28 NOTE — ED PROVIDER NOTES
History   Chief Complaint:  Palpitations       HPI   Shabnam Shukla is a 56 year old female with history of atrial flutter with RVR, s/p ablation who presents with palpitations. Per Chart Review, patient was admitted on 2/21/2021 for chest pain after having atrial flutter with RVR. She had empiric ablation in the hospital and was instructed to take Eliquis for 1 month. She also had a coronary angiogram minimal non-occlusive CAD. Patient reports having pounding palpitation at midnight that lasted 45 minutes with shortness of breath, which felt similar to her atrial flutter in the past. She was not doing anything at that time. She also has had burning sensation in her chest. She states having ongoing shortness of breath with feeling of blacking out with exertion even when taking the garbage out. She states that the shortness of breath went away after the ablation but slowly came back. No fever, coughing, leg swelling, leg pain, chest pain, vomiting, diarrhea, melena, blood in stool. No known COVID exposures. Patient does not have a cardiologist. Mo recent traveling.    Review of Systems   Constitutional: Negative for fever.   Respiratory: Positive for chest tightness. Negative for cough.    Cardiovascular: Positive for palpitations. Negative for chest pain and leg swelling.   Gastrointestinal: Negative for blood in stool, diarrhea and vomiting.   All other systems reviewed and are negative.    Allergies:  No Known Allergies    Medications:  citalopram   FLUoxetine   glucosamine-chondroitin     Past Medical History:     Hyperplastic Colon Polyp   Lymphocytic Colitis   SCC  Enterocele  Tobacco Abuse   Chronic Insomnia   BELLA   Atrial flutter with rapid ventricular response (H)  Anxiety       Past Surgical History:    Laparoscopic   MOHS Surgery   Lysis of Adhesions   Salpingectomy   Enterocele Repair   Incontinence Surgery   Hysterectomy      Family History:    Mother - Dementia, Heart Attacks  Father - Hypertension  "    Social History:  Presents alone.    Physical Exam     Patient Vitals for the past 24 hrs:   BP Temp Temp src Pulse Resp SpO2 Height Weight   02/28/22 0445 -- -- -- 78 15 95 % -- --   02/28/22 0430 109/78 -- -- 81 18 95 % -- --   02/28/22 0400 112/71 -- -- 80 -- 95 % -- --   02/28/22 0315 -- -- -- 81 19 -- -- --   02/28/22 0300 -- -- -- 86 13 -- -- --   02/28/22 0245 -- -- -- 84 11 -- -- --   02/28/22 0223 (!) 139/95 97.8  F (36.6  C) Temporal 98 16 98 % 1.6 m (5' 3\") 68 kg (150 lb)     Physical Exam  Nursing note and vitals reviewed.  Constitutional:  Appears well-developed and well-nourished.   HENT:   Head:    Atraumatic.   Mouth/Throat:   Oropharynx is clear and moist. No oropharyngeal exudate.   Eyes:    Pupils are equal, round, and reactive to light.   Neck:    Normal range of motion. Neck supple.      No tracheal deviation present. No thyromegaly present.   Cardiovascular:  Normal rate, regular rhythm, no murmur   Pulmonary/Chest: Breath sounds are clear and equal without wheezes or crackles.  Abdominal:   Soft. Bowel sounds are normal. Exhibits no distension and      no mass. There is no tenderness.      There is no rebound and no guarding.   Musculoskeletal:  Exhibits no edema.   Lymphadenopathy:  No cervical adenopathy.   Neurological:   Alert and oriented to person, place, and time.   Skin:    Skin is warm and dry. No rash noted. No pallor.     Emergency Department Course   ECG  ECG obtained at 0229, ECG read at 0233  Normal sinus rhythm.  Nonspecific T wave abnormality.   No significant change as compared to prior, dated 2/22/21.  Rate 83 bpm. NM interval 158 ms. QRS duration 76 ms. QT/QTc 352/413 ms. P-R-T axes 72 63 43.     Imaging:  XR Chest 2 Views   Final Result   IMPRESSION: No acute abnormality.      Holter Monitor 48 hour Adult Pediatric    (Results Pending)     Report per radiology    Laboratory:  Labs Ordered and Resulted from Time of ED Arrival to Time of ED Departure   COMPREHENSIVE " METABOLIC PANEL - Abnormal       Result Value    Sodium 137      Potassium 4.4      Chloride 106      Carbon Dioxide (CO2) 25      Anion Gap 6      Urea Nitrogen 13      Creatinine 0.92      Calcium 8.8      Glucose 101 (*)     Alkaline Phosphatase 56      AST 29      ALT 29      Protein Total 7.7      Albumin 3.8      Bilirubin Total 0.3      GFR Estimate 72     TROPONIN I - Normal    Troponin I High Sensitivity 5     D DIMER QUANTITATIVE - Normal    D-Dimer Quantitative 0.46     CBC WITH PLATELETS AND DIFFERENTIAL    WBC Count 7.7      RBC Count 4.56      Hemoglobin 14.1      Hematocrit 42.2      MCV 93      MCH 30.9      MCHC 33.4      RDW 12.7      Platelet Count 308      % Neutrophils 42      % Lymphocytes 43      % Monocytes 12      % Eosinophils 2      % Basophils 1      % Immature Granulocytes 0      NRBCs per 100 WBC 0      Absolute Neutrophils 3.2      Absolute Lymphocytes 3.3      Absolute Monocytes 0.9      Absolute Eosinophils 0.2      Absolute Basophils 0.1      Absolute Immature Granulocytes 0.0      Absolute NRBCs 0.0        Emergency Department Course:     Reviewed:  I reviewed nursing notes, vitals, past medical history, Care Everywhere and [facility]    Assessments:  0300 I obtained history and examined the patient as noted above.   0344 I rechecked the patient and explained findings.   0510 I rechecked the patient    Disposition:  The patient was discharged to home.     Impression & Plan     Medical Decision Making:  This patient has a history of atrial flutter in the past S/P ablation procedure and arrives with palpitations and heart racing.  EKG was performed and she is currently in normal sinus rhythm with no acute ischemic changes and her troponin is negative, ruling out acute myocardial infarction.  She has not been having any chest pain and no shortness of breath at rest however she has had shortness of breath with exertion for approximately 8 months.  There was no sign of pulmonary embolism  since her D-dimer is normal and she is not hypoxic or tachycardic here so I did not feel CT imaging was indicated.  Chest x-ray is normal without any sign of pulmonary edema, pleural effusion or pneumonia.  She has had a prior coronary angiography showed no significant coronary artery disease approximately 1 year ago.  It is possible that the patient was in atrial flutter or fibrillation as the cause of her heart racing and palpitations earlier today and spontaneously cardioverted to normal sinus rhythm.  Therefore I ordered a Holter monitor for her to wear she was told to follow-up with her cardiologist this week.  She was told signs and symptoms to return for and I felt she be safely discharged home.    Diagnosis:    ICD-10-CM    1. Palpitations  R00.2 Holter Monitor 48 hour Adult Pediatric     Adult Cardiology Eval  Referral   2. Dyspnea on exertion  R06.00 Adult Cardiology Eval  Referral       Discharge Medications:  New Prescriptions    No medications on file       Scribe Disclosure:  I, Robbi Kessler, am serving as a scribe at 2:31 AM on 2/28/2022 to document services personally performed by Zabrina Santiago MD based on my observations and the provider's statements to me.            Zabrina Santiago MD  02/28/22 0746

## 2022-02-28 NOTE — ED NOTES
Bed: ED06  Expected date:   Expected time:   Means of arrival:   Comments:  536 56f palpitaitons Hx of A fib

## 2022-03-01 ENCOUNTER — HOSPITAL ENCOUNTER (OUTPATIENT)
Dept: CARDIOLOGY | Facility: CLINIC | Age: 57
Discharge: HOME | End: 2022-03-01
Attending: EMERGENCY MEDICINE | Admitting: EMERGENCY MEDICINE
Payer: COMMERCIAL

## 2022-03-01 DIAGNOSIS — R00.2 PALPITATIONS: ICD-10-CM

## 2022-03-01 PROCEDURE — 93225 XTRNL ECG REC<48 HRS REC: CPT

## 2022-03-01 PROCEDURE — 93227 XTRNL ECG REC<48 HR R&I: CPT | Performed by: INTERNAL MEDICINE

## 2022-03-02 ENCOUNTER — TELEPHONE (OUTPATIENT)
Dept: CARDIOLOGY | Facility: CLINIC | Age: 57
End: 2022-03-02
Payer: COMMERCIAL

## 2022-03-02 NOTE — TELEPHONE ENCOUNTER
Per Dr. Chin's message: please move the patient to a date after the holter report is completed.    Attempted to contact the patient to discuss moving her appointment to a future time to be able to review the holter report. Left message for patient to call back.    Message to interior scheduling team to move the patient to Dr. Chin's reading day or next NEW or DOD spot, whichever is earlier.

## 2022-03-03 NOTE — TELEPHONE ENCOUNTER
Pt came into clinic not aware of appointment cancellation.  She is now rescheduled for two weeks and today she dropped off her holter monitor.

## 2022-03-14 ENCOUNTER — OFFICE VISIT (OUTPATIENT)
Dept: CARDIOLOGY | Facility: CLINIC | Age: 57
End: 2022-03-14
Payer: COMMERCIAL

## 2022-03-14 VITALS
WEIGHT: 151 LBS | DIASTOLIC BLOOD PRESSURE: 77 MMHG | SYSTOLIC BLOOD PRESSURE: 129 MMHG | HEIGHT: 64 IN | OXYGEN SATURATION: 100 % | BODY MASS INDEX: 25.78 KG/M2 | HEART RATE: 83 BPM

## 2022-03-14 DIAGNOSIS — R06.09 DYSPNEA ON EXERTION: ICD-10-CM

## 2022-03-14 DIAGNOSIS — R00.2 PALPITATIONS: ICD-10-CM

## 2022-03-14 PROCEDURE — 99214 OFFICE O/P EST MOD 30 MIN: CPT | Performed by: INTERNAL MEDICINE

## 2022-03-14 RX ORDER — METOPROLOL TARTRATE 25 MG/1
25 TABLET, FILM COATED ORAL 2 TIMES DAILY
Qty: 60 TABLET | Refills: 0 | Status: SHIPPED | OUTPATIENT
Start: 2022-03-14 | End: 2022-05-09

## 2022-03-14 NOTE — PROGRESS NOTES
HPI and Plan:   See dictation        Encounter Diagnoses   Name Primary?     Palpitations      Dyspnea on exertion        CURRENT MEDICATIONS:  Current Outpatient Medications   Medication Sig Dispense Refill     citalopram (CELEXA) 40 MG tablet Take 40 mg by mouth daily       FLUoxetine (PROZAC) 10 MG capsule Take 20 mg by mouth 2 times daily       multivitamin, therapeutic (THERA-VIT) TABS tablet Take 1 tablet by mouth daily       vitamin D3 (CHOLECALCIFEROL) 50 mcg (2000 units) tablet Take 1 tablet by mouth daily       glucosamine-chondroitin 500-400 MG CAPS per capsule Take 1 capsule by mouth daily (Patient not taking: Reported on 3/14/2022)       vitamin (B COMPLEX-C) tablet Take 1 tablet by mouth daily (Patient not taking: Reported on 3/14/2022)         ALLERGIES   No Known Allergies    PAST MEDICAL HISTORY:  Past Medical History:   Diagnosis Date     Tobacco abuse 3/16/2021       PAST SURGICAL HISTORY:  Past Surgical History:   Procedure Laterality Date     CV CORONARY ANGIOGRAM N/A 2/22/2021    Procedure: Coronary Angiogram;  Surgeon: Lucille Francis MD;  Location:  HEART CARDIAC CATH LAB     CV LEFT HEART CATH N/A 2/22/2021    Procedure: Left Heart Cath;  Surgeon: Lucille Francis MD;  Location:  HEART CARDIAC CATH LAB     EP ABLATION ATRIAL FLUTTER N/A 2/22/2021    Procedure: EP Ablation Atrial Flutter;  Surgeon: Juan R Lloyd MD;  Location:  HEART CARDIAC CATH LAB       FAMILY HISTORY:  History reviewed. No pertinent family history.    SOCIAL HISTORY:  Social History     Socioeconomic History     Marital status:      Spouse name: None     Number of children: None     Years of education: None     Highest education level: None   Occupational History     None   Tobacco Use     Smoking status: Current Some Day Smoker     Types: Cigarettes     Smokeless tobacco: Never Used     Tobacco comment: 1-3 cigarettes per day   Substance and Sexual Activity     Alcohol use: Yes     Alcohol/week:  0.0 standard drinks     Comment: rare     Drug use: None     Sexual activity: None   Other Topics Concern     Parent/sibling w/ CABG, MI or angioplasty before 65F 55M? Not Asked   Social History Narrative     None     Social Determinants of Health     Financial Resource Strain: Not on file   Food Insecurity: Not on file   Transportation Needs: Not on file   Physical Activity: Not on file   Stress: Not on file   Social Connections: Not on file   Intimate Partner Violence: Not on file   Housing Stability: Not on file       Review of Systems:  Skin:          Eyes:         ENT:         Respiratory:          Cardiovascular:         Gastroenterology:        Genitourinary:         Musculoskeletal:         Neurologic:         Psychiatric:         Heme/Lymph/Imm:         Endocrine:           Physical Exam:  Vitals: There were no vitals taken for this visit.    Constitutional:           Skin:  warm and dry to the touch, no apparent skin lesions or masses noted          Head:  normocephalic, no masses or lesions        Eyes:  pupils equal and round        Lymph:      ENT:  no pallor or cyanosis, dentition good        Neck:  JVP normal        Respiratory:  clear to auscultation         Cardiac: regular rhythm                pulses full and equal                                        GI:  abdomen soft        Extremities and Muscular Skeletal:  no edema              Neurological:  affect appropriate        Psych:  Alert and Oriented x 3        CC  Zabrina Santiago MD  [facility]  [address]  [place] [address]

## 2022-03-14 NOTE — LETTER
3/14/2022    [facility], MD  [address] [address]    RE: Shabnam Shukla       Dear Colleague,     I had the pleasure of seeing Shabnam Shukla in the [facility].  HPI and Plan:   See dictation        Encounter Diagnoses   Name Primary?     Palpitations      Dyspnea on exertion        CURRENT MEDICATIONS:  Current Outpatient Medications   Medication Sig Dispense Refill     citalopram (CELEXA) 40 MG tablet Take 40 mg by mouth daily       FLUoxetine (PROZAC) 10 MG capsule Take 20 mg by mouth 2 times daily       multivitamin, therapeutic (THERA-VIT) TABS tablet Take 1 tablet by mouth daily       vitamin D3 (CHOLECALCIFEROL) 50 mcg (2000 units) tablet Take 1 tablet by mouth daily       glucosamine-chondroitin 500-400 MG CAPS per capsule Take 1 capsule by mouth daily (Patient not taking: Reported on 3/14/2022)       vitamin (B COMPLEX-C) tablet Take 1 tablet by mouth daily (Patient not taking: Reported on 3/14/2022)         ALLERGIES   No Known Allergies    PAST MEDICAL HISTORY:  Past Medical History:   Diagnosis Date     Tobacco abuse 3/16/2021       PAST SURGICAL HISTORY:  Past Surgical History:   Procedure Laterality Date     CV CORONARY ANGIOGRAM N/A 2/22/2021    Procedure: Coronary Angiogram;  Surgeon: Lucille Francis MD;  [facility]: [facility] CARDIAC C[facility] LAB     CV LEFT HEART CATH N/A 2/22/2021    Procedure: Left Heart Cath;  Surgeon: Lucille Francis MD;  Location:  HEART CARDIAC CATH LAB     EP ABLATION ATRIAL FLUTTER N/A 2/22/2021    Procedure: EP Ablation Atrial Flutter;  Surgeon: Juan R Lloyd MD;  Location:  HEART CARDIAC CATH LAB       FAMILY HISTORY:  History reviewed. No pertinent family history.    SOCIAL HISTORY:  Social History     Socioeconomic History     Marital status:      Spouse name: None     Number of children: None     Years of education: None     Highest education level: None   Occupational History     None   Tobacco Use      Smoking status: Current Some Day Smoker     Types: Cigarettes     Smokeless tobacco: Never Used     Tobacco comment: 1-3 cigarettes per day   Substance and Sexual Activity     Alcohol use: Yes     Alcohol/week: 0.0 standard drinks     Comment: rare     Drug use: None     Sexual activity: None   Other Topics Concern     Parent/sibling w/ CABG, MI or angioplasty before 65F 55M? Not Asked   Social History Narrative     None     Social Determinants of Health     Financial Resource Strain: Not on file   Food Insecurity: Not on file   Transportation Needs: Not on file   Physical Activity: Not on file   Stress: Not on file   Social Connections: Not on file   Intimate Partner Violence: Not on file   Housing Stability: Not on file       Review of Systems:  Skin:          Eyes:         ENT:         Respiratory:          Cardiovascular:         Gastroenterology:        Genitourinary:         Musculoskeletal:         Neurologic:         Psychiatric:         Heme/Lymph/Imm:         Endocrine:           Physical Exam:  Vitals: There were no vitals taken for this visit.    Constitutional:           Skin:  warm and dry to the touch, no apparent skin lesions or masses noted          Head:  normocephalic, no masses or lesions        Eyes:  pupils equal and round        Lymph:      ENT:  no pallor or cyanosis, dentition good        Neck:  JVP normal        Respiratory:  clear to auscultation         Cardiac: regular rhythm                pulses full and equal                                        GI:  abdomen soft        Extremities and Muscular Skeletal:  no edema              Neurological:  affect appropriate        Psych:  Alert and Oriented x 3        CC  Zabrina Santiago MD  [facility]  [address] [address]    Thank you for allowing me to participate in the care of your patient.      Sincerely,     Shona Chin MD   [facility]

## 2022-03-14 NOTE — PROGRESS NOTES
Service Date: 03/14/2022    CARDIOLOGY CONSULTATION    REASON FOR CONSULTATION:  History of atrial flutter status post ablation, palpitations.    HISTORY OF PRESENT ILLNESS:  I had the pleasure of seeing Ms. Shukla in consultation at the [facility] today.  She is a very pleasant 56-year-old female who was admitted to [facility] in 02/2021 with chest discomfort and palpitations in the setting of a new diagnosis of paroxysmal atrial flutter.  She underwent a comprehensive workup at the time which included an echocardiogram which was essentially normal.  Coronary angiography demonstrated minimal nonobstructive coronary artery disease and she subsequently underwent successful atrial flutter ablation on 02/22/2021.    She actually did quite well for several months afterwards but states that in the past few months, she has noted a recurrence of her palpitations.  They tend to occur with no specific trigger in that she has noticed them both at rest and while exerting herself at home.  She complains of associated chest discomfort when the palpitations occur as well as shortness of breath.  She denies any syncope or presyncope.    She actually presented to the emergency department on 02/28/2022 with recurrent palpitations.  Her evaluation was unremarkable but a Holter monitor was recommended.  This was completed a few days ago.    The Holter monitor demonstrated sinus rhythm with occasional PACs.  She did experience palpitations during the monitoring period that correlated with supraventricular beats.  Having said that, she did not experience any significant episodes of palpitations while she was wearing the Holter monitor.    Her past medical history, family history, social history, allergies and medications are reviewed in my Epic note.    PHYSICAL EXAMINATION:  Dictated below.    I personally reviewed her Holter monitor and agree with the findings as described.  Specifically, no  evidence of recurrent atrial flutter was noted while she was wearing the Holter monitor.    I reviewed her recent blood work at the time of her emergency department visit which included a D-dimer and troponin, both of which were within normal limits.  Similarly, a CBC and comprehensive metabolic panel were also within normal limits as well as her chest x-ray.    IMPRESSION:    1.  Atrial flutter status post ablation as described above.  2.  Recurrent palpitations that have correlated with supraventricular beats noted during a recent event monitor.    Ms. Shukla presents for a cardiac reassessment in the setting of recurrent palpitations after an ablation for atrial flutter approximately 1 year ago.  The palpitations are similar to what she experienced prior to her ablation for recurrent supraventricular arrhythmias; would be the most likely diagnosis.  As stated above, she did experience some supraventricular beats which did correlate with her symptoms.    At this point in time, I have recommended an exercise stress echocardiogram for reassessment of her left ventricular systolic function as well as to ensure she has a normal chronotropic response to exercise.  I have asked her to start taking metoprolol 25 mg daily, assuming the stress echocardiogram is within normal limits, and we will repeat a 30-day event monitor at that time.  Depending on the results, she may need to be reevaluated by our Electrophysiology colleagues.    I will also obtain a TSH to follow up on the minor abnormalities that were noted at the time of her hospitalization last year.    It was a pleasure seeing her in consultation.    Shona Chin MD        D: 2022   T: 2022   MT: shilpi    Name:     CHUCK SHUKLA  MRN:      [id number]        Account:      [id number]   :      1965           Service Date: 2022       Document: [id number]

## 2022-04-08 ENCOUNTER — HOSPITAL ENCOUNTER (OUTPATIENT)
Dept: CARDIOLOGY | Facility: CLINIC | Age: 57
Discharge: HOME | End: 2022-04-08
Attending: INTERNAL MEDICINE | Admitting: INTERNAL MEDICINE
Payer: COMMERCIAL

## 2022-04-08 ENCOUNTER — TELEPHONE (OUTPATIENT)
Dept: CARDIOLOGY | Facility: CLINIC | Age: 57
End: 2022-04-08

## 2022-04-08 DIAGNOSIS — R00.2 PALPITATIONS: ICD-10-CM

## 2022-04-08 PROCEDURE — 93270 REMOTE 30 DAY ECG REV/REPORT: CPT

## 2022-04-08 PROCEDURE — 93018 CV STRESS TEST I&R ONLY: CPT | Performed by: INTERNAL MEDICINE

## 2022-04-08 PROCEDURE — 93016 CV STRESS TEST SUPVJ ONLY: CPT | Performed by: INTERNAL MEDICINE

## 2022-04-08 PROCEDURE — 93272 ECG/REVIEW INTERPRET ONLY: CPT | Performed by: INTERNAL MEDICINE

## 2022-04-08 PROCEDURE — 93321 DOPPLER ECHO F-UP/LMTD STD: CPT | Mod: 26 | Performed by: INTERNAL MEDICINE

## 2022-04-08 PROCEDURE — 93325 DOPPLER ECHO COLOR FLOW MAPG: CPT | Mod: 26 | Performed by: INTERNAL MEDICINE

## 2022-04-08 PROCEDURE — 93350 STRESS TTE ONLY: CPT | Mod: 26 | Performed by: INTERNAL MEDICINE

## 2022-04-08 PROCEDURE — 93325 DOPPLER ECHO COLOR FLOW MAPG: CPT | Mod: TC

## 2022-04-08 NOTE — PROGRESS NOTES
30 Day cardiac event monitor was set up, written and verbal instructions were given.  Stress echo completed.

## 2022-04-08 NOTE — TELEPHONE ENCOUNTER
Awaiting Monitor results and Patient then will be contacted with both echo and monitor results.    Dr. Mcgowan reviewed echo.

## 2022-04-08 NOTE — TELEPHONE ENCOUNTER
Echo stress test 4-8-22  The Duke treadmill score was intermediate risk ( -11< Loja score <5).  The patient exhibited no chest pain during exercise.  The inability to achieve 6 minutes of exercise will reduce the accuracy of the  study to detect ischemia.  There was a hypertensive BP response to exercise.  This was a normal stress echocardiogram with no evidence of stress-induced  ischemia. This was a normal stress EKG with no evidence of stress-induced  Ischemia.    Monitor placed today    Last Dr. Chin OV 3-14-22 for palpitations and dyspnea on exertion.   No Follow up OV scheduled at this time

## 2022-04-11 ENCOUNTER — TELEPHONE (OUTPATIENT)
Dept: CARDIOLOGY | Facility: CLINIC | Age: 57
End: 2022-04-11
Payer: COMMERCIAL

## 2022-04-11 DIAGNOSIS — I48.92 ATRIAL FLUTTER WITH RAPID VENTRICULAR RESPONSE (H): ICD-10-CM

## 2022-04-11 DIAGNOSIS — R00.2 PALPITATIONS: Primary | ICD-10-CM

## 2022-04-11 NOTE — TELEPHONE ENCOUNTER
30 day biotel event monitor started to review palpitations.    Per Dr. Chin's dictation 3/14/2022:  I have asked her to start taking metoprolol 25 mg daily, assuming the stress echocardiogram is within normal limits, and we will repeat a 30-day event monitor at that time.  Depending on the results, she may need to be reevaluated by our Electrophysiology colleagues.    30-day biotel baseline strip 4/8/2022 showing sinus rhythm @ 88-92 BPM noted.    biotel strip 4/9/2022 @ 10:51 showing sinus rhythm @ 92 BPM noted. Patient pushed the button for skipped beat, SOB, heart racing, dizzy.    biotel strip 4/9/2022 @ 15:45 showing sinus tachycardia with PVCs @ 104 BPM noted. Patient pushed the button for light headed, SOB, heart racing dizzy    biotel strip 4/9/2022 @ 17:02 showing sinus tachycardia @ 114 BPM noted. Patient pushed the button for skipped beat, light headed, SOB, heart racing, dizzy.    0911 called patient to review. She states she had not started the lopressor 25mg BID yet as she thought she was to wait for directions to do so. Reviewed with patient that the script was sent the day of her visit. Reviewed that the stress echo was read as normal. Patient will  the script and start tonight.

## 2022-04-13 NOTE — TELEPHONE ENCOUNTER
biotel strip 4/9/2022 showing sinus rhythm with PACs @ 80 BPM noted. Patient pushed the button for skipped beat, lightheaded, SOB, heart racing, dizzy    biotel strip 4/11/2022 showing sinus rhythm with atrial couplet @ 78-91 BPM noted. Patient pushed the button for light headed, SOB, heart racing, chest pain    Strips placed in event folder

## 2022-04-14 NOTE — TELEPHONE ENCOUNTER
biotel strip 4/12/2022 showing sinus rhythm with PACs @ 82 BPM noted. Strip placed in event folder

## 2022-04-17 ENCOUNTER — HEALTH MAINTENANCE LETTER (OUTPATIENT)
Age: 57
End: 2022-04-17

## 2022-04-18 ENCOUNTER — TELEPHONE (OUTPATIENT)
Dept: CARDIOLOGY | Facility: CLINIC | Age: 57
End: 2022-04-18
Payer: COMMERCIAL

## 2022-04-18 NOTE — TELEPHONE ENCOUNTER
Per Dr. Chin's reply Patient called.   Patient agrees with starting Eliquis 5 mg one tablet twice daily and F/Up Monitor results with EP.  Orders placed and prescription e scribed to pharmacy.  Patient will call with any questions or concerns.

## 2022-04-18 NOTE — TELEPHONE ENCOUNTER
Urgent BioTel strip received from 4-17-22 SR with new onset A Fiv rate 154 bpm.    Spoke wit Patient who states she was in bed at the time. Got up quickly and than HR increased. Lasted Probably about 2-3 minutes associated  by shortness of breath and some light headedness.  Feels fine today.  Currently taking metoprolol tartrate 25 mg twice daily  Not on an ASA    Wearing monitor for another 2 weeks.      Last Dr. Chin visit 3-14-22 for palpitations and dyspnea.     Echo done 4-8-22.     No follow up scheduled at this time.

## 2022-04-18 NOTE — TELEPHONE ENCOUNTER
[facility] Call Center    Phone Message    May a detailed message be left on voicemail: yes     Reason for Call: Please call to get Critical EKG results for this patient from [facility]. [phone] option 1.    (Priority line tx to RN line and  got voice mail-so sending high priority TE.)    Action Taken: Cardiology    Travel Screening: Not Applicable

## 2022-04-18 NOTE — TELEPHONE ENCOUNTER
Let's start her on eliquis for now - 5 MG PO BID and arrange EP follow up after the monitor is complete. Thanks.

## 2022-04-20 NOTE — TELEPHONE ENCOUNTER
Strips received for evening 4/17/22. Pt has been initiated on eliquis with plans to see EP per Dr Chin. Scheduling messaged to set up appointment.

## 2022-04-26 ENCOUNTER — TELEPHONE (OUTPATIENT)
Dept: CARDIOLOGY | Facility: CLINIC | Age: 57
End: 2022-04-26
Payer: COMMERCIAL

## 2022-05-09 DIAGNOSIS — R00.2 PALPITATIONS: ICD-10-CM

## 2022-05-09 RX ORDER — METOPROLOL TARTRATE 25 MG/1
25 TABLET, FILM COATED ORAL 2 TIMES DAILY
Qty: 60 TABLET | Refills: 1 | Status: SHIPPED | OUTPATIENT
Start: 2022-05-09 | End: 2022-06-22

## 2022-05-18 ENCOUNTER — DOCUMENTATION ONLY (OUTPATIENT)
Dept: CARDIOLOGY | Facility: CLINIC | Age: 57
End: 2022-05-18
Payer: COMMERCIAL

## 2022-05-18 NOTE — LETTER
May 18, 2022       TO: Shabnam Shukla  [address] [address]  [facility] [address]       Dear Shabnam Shukla,    Your heart monitor showed mostly a normal sinus rhythm with skipped beats (premature ventricular contractions/premature atrial contractions which are benign), but it did show a few episodes of atrial fibrillation as well.     We have been trying to reach you about setting up a visit with one of our heart rhythm specialists, but have not been able to reach you. You are also due for labs.     Please call scheduling at [phone] schedule your appointment as soon as possible.    Thank you,  Team 2 Nurses  [facility]  [phone]

## 2022-06-22 DIAGNOSIS — R00.2 PALPITATIONS: ICD-10-CM

## 2022-06-22 RX ORDER — METOPROLOL TARTRATE 25 MG/1
25 TABLET, FILM COATED ORAL 2 TIMES DAILY
Qty: 180 TABLET | Refills: 0 | Status: SHIPPED | OUTPATIENT
Start: 2022-06-22 | End: 2023-04-05

## 2022-10-23 ENCOUNTER — HEALTH MAINTENANCE LETTER (OUTPATIENT)
Age: 57
End: 2022-10-23

## 2023-04-02 ENCOUNTER — HEALTH MAINTENANCE LETTER (OUTPATIENT)
Age: 58
End: 2023-04-02

## 2023-04-05 ENCOUNTER — VIRTUAL VISIT (OUTPATIENT)
Dept: FAMILY MEDICINE | Facility: OTHER | Age: 58
End: 2023-04-05

## 2023-04-05 DIAGNOSIS — F41.9 ANXIETY: Primary | ICD-10-CM

## 2023-04-05 PROCEDURE — 99203 OFFICE O/P NEW LOW 30 MIN: CPT | Mod: 95 | Performed by: PHYSICIAN ASSISTANT

## 2023-04-05 PROCEDURE — 96127 BRIEF EMOTIONAL/BEHAV ASSMT: CPT | Mod: 59 | Performed by: PHYSICIAN ASSISTANT

## 2023-04-05 RX ORDER — CITALOPRAM HYDROBROMIDE 40 MG/1
40 TABLET ORAL DAILY
Qty: 90 TABLET | Refills: 1 | Status: SHIPPED | OUTPATIENT
Start: 2023-04-05

## 2023-04-05 RX ORDER — FLUOXETINE 10 MG/1
20 CAPSULE ORAL 2 TIMES DAILY
Status: CANCELLED | OUTPATIENT
Start: 2023-04-05

## 2023-04-05 RX ORDER — BUSPIRONE HYDROCHLORIDE 30 MG/1
1 TABLET ORAL 2 TIMES DAILY
COMMUNITY
Start: 2022-12-27

## 2023-04-05 RX ORDER — BUPROPION HYDROCHLORIDE 150 MG/1
150 TABLET ORAL EVERY MORNING
Qty: 90 TABLET | Refills: 1 | Status: SHIPPED | OUTPATIENT
Start: 2023-04-05

## 2023-04-05 ASSESSMENT — ANXIETY QUESTIONNAIRES
8. IF YOU CHECKED OFF ANY PROBLEMS, HOW DIFFICULT HAVE THESE MADE IT FOR YOU TO DO YOUR WORK, TAKE CARE OF THINGS AT HOME, OR GET ALONG WITH OTHER PEOPLE?: NOT DIFFICULT AT ALL
2. NOT BEING ABLE TO STOP OR CONTROL WORRYING: NEARLY EVERY DAY
7. FEELING AFRAID AS IF SOMETHING AWFUL MIGHT HAPPEN: NEARLY EVERY DAY
3. WORRYING TOO MUCH ABOUT DIFFERENT THINGS: NEARLY EVERY DAY
GAD7 TOTAL SCORE: 15
5. BEING SO RESTLESS THAT IT IS HARD TO SIT STILL: NOT AT ALL
4. TROUBLE RELAXING: NEARLY EVERY DAY
7. FEELING AFRAID AS IF SOMETHING AWFUL MIGHT HAPPEN: NEARLY EVERY DAY
GAD7 TOTAL SCORE: 15
1. FEELING NERVOUS, ANXIOUS, OR ON EDGE: NEARLY EVERY DAY
GAD7 TOTAL SCORE: 15
6. BECOMING EASILY ANNOYED OR IRRITABLE: NOT AT ALL
IF YOU CHECKED OFF ANY PROBLEMS ON THIS QUESTIONNAIRE, HOW DIFFICULT HAVE THESE PROBLEMS MADE IT FOR YOU TO DO YOUR WORK, TAKE CARE OF THINGS AT HOME, OR GET ALONG WITH OTHER PEOPLE: NOT DIFFICULT AT ALL

## 2023-04-05 ASSESSMENT — PATIENT HEALTH QUESTIONNAIRE - PHQ9
SUM OF ALL RESPONSES TO PHQ QUESTIONS 1-9: 3
SUM OF ALL RESPONSES TO PHQ QUESTIONS 1-9: 3
10. IF YOU CHECKED OFF ANY PROBLEMS, HOW DIFFICULT HAVE THESE PROBLEMS MADE IT FOR YOU TO DO YOUR WORK, TAKE CARE OF THINGS AT HOME, OR GET ALONG WITH OTHER PEOPLE: NOT DIFFICULT AT ALL

## 2023-04-05 NOTE — PROGRESS NOTES
Shabnam is a 57 year old who is being evaluated via a billable telephone visit.      What phone number would you like to be contacted at? [phone]   How would you like to obtain your AVS? Clair  Distant Location (provider location):  On-site    Assessment & Plan     Anxiety  She is going to be establishing care within the System as she changed insurance.   For now we refilled her Citalopram.  We did discuss considering switching to different SSRI such as escitalopram but right now she wants to hold on that.   She can continue her Buspar and she declines need for refill on this at this time.   We did add on Wellbutrin, we discussed how this could be beneficial, how to take and potential side effects of the medication.    - citalopram (CELEXA) 40 MG tablet; Take 1 tablet (40 mg) by mouth daily  - buPROPion (WELLBUTRIN XL) 150 MG 24 hr tablet; Take 1 tablet (150 mg) by mouth every morning    Recommend Follow-up in 3 months to establish care and med check, sooner if worse or new concerns.     Options for treatment and follow-up care were reviewed with the patient and/or guardian. Patient and/or guardian engaged in the decision making process and verbalized understanding of the options discussed and agreed with the final plan.     Sherrell Hood PA-C  [facility]   Shabnam is a 57 year old, presenting for the following health issues:  Medication Refill (Citalopram)        4/5/2023     1:41 PM   Additional Questions   Roomed by Chaz WOOTEN   Accompanied by N/A         4/5/2023     1:41 PM   Patient Reported Additional Medications   Patient reports taking the following new medications None     History of Present Illness       Mental Health Follow-up:  Patient presents to follow-up on Depression & Anxiety.Patient's depression since last visit has been:  Good  The patient is not having other symptoms associated with depression.  Patient's anxiety since last visit has been:  No  change  The patient is not having other symptoms associated with anxiety.  Any significant life events: No  Patient is feeling anxious or having panic attacks.  Patient has no concerns about alcohol or drug use.    She eats 0-1 servings of fruits and vegetables daily.She consumes 0 sweetened beverage(s) daily.She exercises with enough effort to increase her heart rate 20 to 29 minutes per day.  She exercises with enough effort to increase her heart rate 3 or less days per week.   She is taking medications regularly.    Today's PHQ-9         PHQ-9 Total Score: 3    PHQ-9 Q9 Thoughts of better off dead/self-harm past 2 weeks :   Not at all    How difficult have these problems made it for you to do your work, take care of things at home, or get along with other people: Not difficult at all  Today's BELLA-7 Score: 15     Has been on medication for many years.   She notes this is the only medication she has been on.   She also has Buspar 30 mg BID but doesn't take regularly because she just doesn't think it works.   The Citalopram works well but still notes butterfly feeling in stomach - anxiety.   She is thinking about medication change but doesn't want to do that right now.       Review of Systems   Constitutional, HEENT, cardiovascular, pulmonary, GI, , musculoskeletal, neuro, skin, endocrine and psych systems are negative, except as otherwise noted.      Objective           Vitals:  No vitals were obtained today due to virtual visit.    Physical Exam   healthy, alert and no distress  PSYCH: Alert and oriented times 3; coherent speech, normal   rate and volume, able to articulate logical thoughts, able   to abstract reason, no tangential thoughts, no hallucinations   or delusions  Her affect is normal  RESP: No cough, no audible wheezing, able to talk in full sentences  Remainder of exam unable to be completed due to telephone visits            Phone call duration: 8:05 minutes

## 2023-06-01 ENCOUNTER — HEALTH MAINTENANCE LETTER (OUTPATIENT)
Age: 58
End: 2023-06-01

## 2023-07-21 DIAGNOSIS — F41.9 ANXIETY: ICD-10-CM

## 2023-07-21 RX ORDER — CITALOPRAM HYDROBROMIDE 40 MG/1
TABLET ORAL
Qty: 90 TABLET | Refills: 1 | OUTPATIENT
Start: 2023-07-21

## 2023-07-21 NOTE — TELEPHONE ENCOUNTER
Should have enough medication until October of this year.   She was supposed to schedule an office visit in person to establish care.     Sherrell Hood PA-C

## 2024-06-08 ENCOUNTER — HEALTH MAINTENANCE LETTER (OUTPATIENT)
Age: 59
End: 2024-06-08

## 2025-04-13 ENCOUNTER — HEALTH MAINTENANCE LETTER (OUTPATIENT)
Age: 60
End: 2025-04-13

## 2025-06-15 ENCOUNTER — HEALTH MAINTENANCE LETTER (OUTPATIENT)
Age: 60
End: 2025-06-15

## (undated) DEVICE — SLEEVE TR BAND RADIAL COMPRESSION DEVICE 24CM TRB24-REG

## (undated) DEVICE — INTRODUCER SHEATH GREEN 6.5FRX11CM .038IN PSI-6F-11-038ACT

## (undated) DEVICE — Device

## (undated) DEVICE — CATH JACKY 5FR 3.5 CURVE 40-5023

## (undated) DEVICE — RAD INTRODUCER KIT MICRO 5FRX10CM .018 NITINOL G/W

## (undated) DEVICE — MEDITRACE MULTIFUNTION ADULT RADIOTRANSPARENT ELECTRODE FOR ZOLL

## (undated) DEVICE — INTRO SHEALTH 8.5FRX63CM SRO 406853

## (undated) DEVICE — TUBE SET SMARKABLATE IRRIGATION

## (undated) DEVICE — PATCH CARTO 3 EXTERNAL REFERENCE 3D MAPPING CREFP6

## (undated) DEVICE — MANIFOLD KIT ANGIO AUTOMATED 014613

## (undated) DEVICE — RAD G/W INQWIRE .035X260CM J-TIP EXCHANGE IQ35F260J1O5RS

## (undated) DEVICE — CATH BLAZER DX-20 DUO-DECAPOLA

## (undated) DEVICE — PACK EP SRG PROC LF DISP SAN32EPFSR

## (undated) DEVICE — CATH THERMOCOOL SMARTTOUCH SF FJ CURVE

## (undated) DEVICE — INTRO GLIDESHEATH SLENDER 6FR 10X45CM 60-1060

## (undated) DEVICE — CATH ANGIO INFINITI JR4 4FRX100CM 538421

## (undated) DEVICE — KIT HAND CONTROL ANGIOTOUCH ACIST 65CM AT-P65

## (undated) DEVICE — INTRO SHEATH 7FRX10CM PINNACLE RSS702

## (undated) RX ORDER — HEPARIN SODIUM 1000 [USP'U]/ML
INJECTION, SOLUTION INTRAVENOUS; SUBCUTANEOUS
Status: DISPENSED
Start: 2021-02-22

## (undated) RX ORDER — FENTANYL CITRATE 50 UG/ML
INJECTION, SOLUTION INTRAMUSCULAR; INTRAVENOUS
Status: DISPENSED
Start: 2021-02-22

## (undated) RX ORDER — HEPARIN SODIUM 200 [USP'U]/100ML
INJECTION, SOLUTION INTRAVENOUS
Status: DISPENSED
Start: 2021-02-22

## (undated) RX ORDER — VERAPAMIL HYDROCHLORIDE 2.5 MG/ML
INJECTION, SOLUTION INTRAVENOUS
Status: DISPENSED
Start: 2021-02-22

## (undated) RX ORDER — LIDOCAINE HYDROCHLORIDE 10 MG/ML
INJECTION, SOLUTION EPIDURAL; INFILTRATION; INTRACAUDAL; PERINEURAL
Status: DISPENSED
Start: 2021-02-22

## (undated) RX ORDER — NITROGLYCERIN 5 MG/ML
VIAL (ML) INTRAVENOUS
Status: DISPENSED
Start: 2021-02-22